# Patient Record
Sex: FEMALE | Race: WHITE | NOT HISPANIC OR LATINO | Employment: FULL TIME | URBAN - METROPOLITAN AREA
[De-identification: names, ages, dates, MRNs, and addresses within clinical notes are randomized per-mention and may not be internally consistent; named-entity substitution may affect disease eponyms.]

---

## 2017-09-25 ENCOUNTER — RECORDS - HEALTHEAST (OUTPATIENT)
Dept: LAB | Facility: CLINIC | Age: 57
End: 2017-09-25

## 2017-09-25 LAB
CHOLEST SERPL-MCNC: 226 MG/DL
FASTING STATUS PATIENT QL REPORTED: ABNORMAL
HDLC SERPL-MCNC: 55 MG/DL
LDLC SERPL CALC-MCNC: 148 MG/DL
TRIGL SERPL-MCNC: 113 MG/DL

## 2017-09-28 LAB
HPV INTERPRETATION - HISTORICAL: ABNORMAL
HPV INTERPRETER - HISTORICAL: ABNORMAL

## 2017-10-02 LAB
BKR LAB AP ABNORMAL BLEEDING: NO
BKR LAB AP BIRTH CONTROL/HORMONES: NORMAL
BKR LAB AP CERVICAL APPEARANCE: NORMAL
BKR LAB AP GYN ADEQUACY: NORMAL
BKR LAB AP GYN INTERPRETATION: NORMAL
BKR LAB AP HPV REFLEX: NORMAL
BKR LAB AP LMP: 2012
BKR LAB AP PATIENT STATUS: NORMAL
BKR LAB AP PREVIOUS ABNORMAL: NORMAL
BKR LAB AP PREVIOUS NORMAL: NORMAL
HIGH RISK?: NO
PATH REPORT.COMMENTS IMP SPEC: NORMAL
RESULT FLAG (HE HISTORICAL CONVERSION): NORMAL

## 2018-05-16 ENCOUNTER — RECORDS - HEALTHEAST (OUTPATIENT)
Dept: LAB | Facility: CLINIC | Age: 58
End: 2018-05-16

## 2018-05-16 LAB
CHOLEST SERPL-MCNC: 225 MG/DL
FASTING STATUS PATIENT QL REPORTED: NO
HDLC SERPL-MCNC: 56 MG/DL
LDLC SERPL CALC-MCNC: 151 MG/DL
TRIGL SERPL-MCNC: 89 MG/DL

## 2019-08-02 ENCOUNTER — RECORDS - HEALTHEAST (OUTPATIENT)
Dept: LAB | Facility: CLINIC | Age: 59
End: 2019-08-02

## 2019-08-02 LAB
CHOLEST SERPL-MCNC: 218 MG/DL
FASTING STATUS PATIENT QL REPORTED: YES
HDLC SERPL-MCNC: 47 MG/DL
LDLC SERPL CALC-MCNC: 139 MG/DL
TRIGL SERPL-MCNC: 160 MG/DL

## 2020-02-24 ENCOUNTER — AMBULATORY - HEALTHEAST (OUTPATIENT)
Dept: CARE COORDINATION | Facility: CLINIC | Age: 60
End: 2020-02-24
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
## 2020-02-26 ENCOUNTER — COMMUNICATION - HEALTHEAST (OUTPATIENT)
Dept: SCHEDULING | Facility: CLINIC | Age: 60
End: 2020-02-26

## 2020-02-26 ENCOUNTER — TELEPHONE (OUTPATIENT)
Dept: NURSING | Facility: CLINIC | Age: 60
End: 2020-02-26

## 2020-02-27 ENCOUNTER — OFFICE VISIT - HEALTHEAST (OUTPATIENT)
Dept: FAMILY MEDICINE | Facility: CLINIC | Age: 60
End: 2020-02-27

## 2020-02-27 DIAGNOSIS — E78.00 HYPERCHOLESTEROLEMIA: ICD-10-CM

## 2020-02-27 DIAGNOSIS — H61.23 BILATERAL IMPACTED CERUMEN: ICD-10-CM

## 2020-02-27 DIAGNOSIS — H65.93 FLUID LEVEL BEHIND TYMPANIC MEMBRANE OF BOTH EARS: ICD-10-CM

## 2020-02-27 DIAGNOSIS — H81.10 BENIGN PAROXYSMAL POSITIONAL VERTIGO, UNSPECIFIED LATERALITY: ICD-10-CM

## 2020-02-27 LAB
ALBUMIN SERPL-MCNC: 3.7 G/DL (ref 3.5–5)
ALP SERPL-CCNC: 137 U/L (ref 45–120)
ALT SERPL W P-5'-P-CCNC: 31 U/L (ref 0–45)
ANION GAP SERPL CALCULATED.3IONS-SCNC: 7 MMOL/L (ref 5–18)
AST SERPL W P-5'-P-CCNC: 24 U/L (ref 0–40)
BILIRUB SERPL-MCNC: 0.6 MG/DL (ref 0–1)
BUN SERPL-MCNC: 7 MG/DL (ref 8–22)
CALCIUM SERPL-MCNC: 9.7 MG/DL (ref 8.5–10.5)
CHLORIDE BLD-SCNC: 103 MMOL/L (ref 98–107)
CHOLEST SERPL-MCNC: 202 MG/DL
CO2 SERPL-SCNC: 30 MMOL/L (ref 22–31)
CREAT SERPL-MCNC: 0.76 MG/DL (ref 0.6–1.1)
FASTING STATUS PATIENT QL REPORTED: ABNORMAL
GFR SERPL CREATININE-BSD FRML MDRD: >60 ML/MIN/1.73M2
GLUCOSE BLD-MCNC: 102 MG/DL (ref 70–125)
HDLC SERPL-MCNC: 53 MG/DL
LDLC SERPL CALC-MCNC: 134 MG/DL
POTASSIUM BLD-SCNC: 4.7 MMOL/L (ref 3.5–5)
PROT SERPL-MCNC: 7.1 G/DL (ref 6–8)
SODIUM SERPL-SCNC: 140 MMOL/L (ref 136–145)
TRIGL SERPL-MCNC: 77 MG/DL

## 2020-02-27 ASSESSMENT — MIFFLIN-ST. JEOR: SCORE: 1451.73

## 2020-03-02 ENCOUNTER — COMMUNICATION - HEALTHEAST (OUTPATIENT)
Dept: INTERNAL MEDICINE | Facility: CLINIC | Age: 60
End: 2020-03-02

## 2020-08-12 ENCOUNTER — OFFICE VISIT - HEALTHEAST (OUTPATIENT)
Dept: FAMILY MEDICINE | Facility: CLINIC | Age: 60
End: 2020-08-12

## 2020-08-12 DIAGNOSIS — E78.00 HYPERCHOLESTEROLEMIA: ICD-10-CM

## 2020-08-12 DIAGNOSIS — F51.01 PRIMARY INSOMNIA: ICD-10-CM

## 2020-08-12 DIAGNOSIS — Z12.11 SCREEN FOR COLON CANCER: ICD-10-CM

## 2020-08-12 DIAGNOSIS — Z12.31 VISIT FOR SCREENING MAMMOGRAM: ICD-10-CM

## 2020-08-12 RX ORDER — ATORVASTATIN CALCIUM 20 MG/1
20 TABLET, FILM COATED ORAL DAILY
Qty: 90 TABLET | Refills: 0 | Status: SHIPPED | OUTPATIENT
Start: 2020-08-12 | End: 2023-08-29

## 2020-08-12 RX ORDER — QUETIAPINE FUMARATE 300 MG/1
300 TABLET, FILM COATED ORAL AT BEDTIME
Qty: 90 TABLET | Refills: 3 | Status: SHIPPED | OUTPATIENT
Start: 2020-08-12 | End: 2021-07-23

## 2020-08-12 ASSESSMENT — PATIENT HEALTH QUESTIONNAIRE - PHQ9: SUM OF ALL RESPONSES TO PHQ QUESTIONS 1-9: 0

## 2021-03-09 ENCOUNTER — COMMUNICATION - HEALTHEAST (OUTPATIENT)
Dept: FAMILY MEDICINE | Facility: CLINIC | Age: 61
End: 2021-03-09

## 2021-04-06 ENCOUNTER — COMMUNICATION - HEALTHEAST (OUTPATIENT)
Dept: FAMILY MEDICINE | Facility: CLINIC | Age: 61
End: 2021-04-06

## 2021-05-27 ASSESSMENT — PATIENT HEALTH QUESTIONNAIRE - PHQ9: SUM OF ALL RESPONSES TO PHQ QUESTIONS 1-9: 0

## 2021-06-04 VITALS
HEART RATE: 75 BPM | HEIGHT: 62 IN | SYSTOLIC BLOOD PRESSURE: 132 MMHG | DIASTOLIC BLOOD PRESSURE: 80 MMHG | WEIGHT: 208 LBS | BODY MASS INDEX: 38.28 KG/M2 | OXYGEN SATURATION: 94 %

## 2021-06-04 VITALS
DIASTOLIC BLOOD PRESSURE: 78 MMHG | SYSTOLIC BLOOD PRESSURE: 134 MMHG | WEIGHT: 217 LBS | BODY MASS INDEX: 39.69 KG/M2 | OXYGEN SATURATION: 96 % | HEART RATE: 57 BPM

## 2021-06-06 NOTE — TELEPHONE ENCOUNTER
----- Message from Gwyn Hubbard MD sent at 2/29/2020  6:51 PM CST -----  Please call:    Elevated lipid panel  The 10-year ASCVD risk score ([place] CAROLINA Jr., et al., 2013) is: 8.7%  Recommend statin for added cardiovascular protection  Prescription sent to the preferred pharmacy    Other labs Normal    Lab values marked (H) or (L) are not clinically/medically significant

## 2021-06-06 NOTE — TELEPHONE ENCOUNTER
"Pt was seen on 2/21/2020 for a fall at [place].   Pt states she fell at work. Fell backwards and hit her head.     Feels dizzy when leaning forward. When she was picking up trash today she became dizzy.   Pt also feels dizzy when laying down.   When she stood up yesterday she had pain near her ribs. States all the way across.   Denies back pain.   Denies headache.   Denies blurry vision.     Hematoma is about 1/4 of the size it was.     Pt states she had the dizziness in the ED and it is slightly better.     Reason for Disposition    [1] MODERATE dizziness (e.g., vertigo; feels very unsteady, interferes with normal activities) AND [2] has been evaluated by physician for this    Protocols used: DIZZINESS - VERTIGO-A-AH    Pt triaged to disposition of \"be seen within 3 days\". RN advised of concerns with elevated blood pressures in the ED and dizziness.  Pt was advised of s/s to watch for and when to call back.    RN discussed care advice per protocol. RN advised to call back with any new or worsening symptoms.   Patient was warm transferred to [place] in scheduling to set up appointment to establish care.   Teena Chavez RN   [facility] RN Triage        "

## 2021-06-06 NOTE — PROGRESS NOTES
Assessment/Plan:        1. Fluid level behind tympanic membrane of both ears  2. Bilateral impacted cerumen  3. Benign paroxysmal positional vertigo, unspecified laterality  Exam findings and pathophysiology discussed    Plan:  - fluticasone propionate (FLONASE) 50 mcg/actuation nasal spray; 1 spray into each nostril 2 (two) times a day.  Dispense: 16 g; Refill: 0  - meclizine (ANTIVERT) 25 mg tablet; Take 1 tablet (25 mg total) by mouth 3 (three) times a day as needed for dizziness or nausea.  Dispense: 30 tablet; Refill: 1    4. Hypercholesterolemia  History of and she asked for rechecking the level    - Lipid Profile-elevated  Would consider going on lipid lowering medication   - Ambulatory referral to Nutrition Services    - Comprehensive Metabolic Panel         At the conclusion of the encounter the plan of care, disposition and all questions were answered and reviewed, and the patient acknowledged understanding and was involved in the decision making regarding the overall care plan.           Subjective:    Patient ID:   Grace Amaro is a 60 y.o. female with a history of hypercholesterolemia, here for healthcare establishment and for ER follow up of 2/21 seen for a fall with a closed head injury to her occiput with fairly boggy occipital hematoma and no loss of consciousness. CT of the head showed a subgaleal hematoma.  She now reports to having vertigo or spinning sensation with head position changes particularly with lying down bending down which she has had for the past couple of months and prior to her head injury         Review of Systems  Allergy: reviewed  General : negative  A complete 5 point review of systems was obtained and is negative other than what is stated in the HPI.       The following patient's history were reviewed and updated as appropriate:   She  has a past medical history of Hyperlipidemia and Insomnia..      Outpatient Encounter Medications as of 2/27/2020   Medication Sig  "Dispense Refill     QUEtiapine (SEROQUEL) 300 MG tablet Take 300 mg by mouth at bedtime.       No facility-administered encounter medications on file as of 2/27/2020.          Objective:   /80 (Patient Site: Right Arm, Patient Position: Sitting, Cuff Size: Adult Large)   Pulse 75   Ht 5' 2\" (1.575 m)   Wt 208 lb (94.3 kg)   LMP  (LMP Unknown)   SpO2 94%   BMI 38.04 kg/m        Physical Exam    General Appearance:    Alert, cooperative, no distress   Head:    Normocephalic, Sinus: Nontender   Eyes:    PERRL, conjunctiva/corneas clear, EOM's intact, both eyes   Ears:   Bilateral impacted cerumen, lavaged, normal canals bilaterally middle ear effusion bilaterally   Throat:   mucous membranes moist, pharynx normal without lesions   Neck:   Supple, symmetrical, trachea midline, no adenopathy;     thyroid:  no enlargement/tenderness/nodules;    Lungs:     clear to auscultation, no wheezes, rales or rhonchi, symmetric air entry    Chest Wall:    No tenderness or deformity    Heart:    Regular rate and rhythm, S1 and S2 normal, no murmur, rub   or gallop   Skin:   Skin color, texture, turgor normal, no rashes or lesions                         "

## 2021-06-10 NOTE — PATIENT INSTRUCTIONS - HE
1. Primary insomnia  - QUEtiapine (SEROQUEL) 300 MG tablet; Take 1 tablet (300 mg total) by mouth at bedtime.  Dispense: 90 tablet; Refill: 3    2. Hypercholesterolemia  - atorvastatin (LIPITOR) 20 MG tablet; Take 1 tablet (20 mg total) by mouth daily.  Dispense: 90 tablet; Refill: 0  - Lipid Profile; Future  - ALT (SGPT); Future    3. Visit for screening mammogram  - Mammo Screening Bilateral; Future    4. Screen for colon cancer  - Ambulatory referral for Colonoscopy

## 2021-06-10 NOTE — PROGRESS NOTES
Assessment/Plan:        1. Primary insomnia  Doing well, no side effects   Nl exam   Plan:   Refill:   - QUEtiapine (SEROQUEL) 300 MG tablet; Take 1 tablet (300 mg total) by mouth at bedtime.  Dispense: 90 tablet; Refill: 3    2. Hypercholesterolemia  Start:   - atorvastatin (LIPITOR) 20 MG tablet; Take 1 tablet (20 mg total) by mouth daily.  Dispense: 90 tablet; Refill: 0    Recheck labs in a month   - Lipid Profile; Future  - ALT (SGPT); Future    3. Visit for screening mammogram  - Mammo Screening Bilateral; Future    4. Screen for colon cancer    - Ambulatory referral for Colonoscopy        At the conclusion of the encounter the plan of care, disposition and all questions were answered and reviewed, and the patient acknowledged understanding and was involved in the decision making regarding the overall care plan.     Patient Care Team:  Gwyn Hubbard MD as PCP - General (Family Medicine)  Gwyn Hubbard MD as Assigned PCP          Subjective:    Patient ID:   Grace Amaro is a 60 y.o. female here for med check and follow up, taking seroquel for insomnia.  She's been on this medication for the past several years experiencing no side effects or intolerance.   She notes that never picked up her cholesterol medication and not sure what happened, but willing to go ahead and give it a try.          Review of Systems  Allergy: reviewed  General : negative  A complete 5 point review of systems was obtained and is negative other than what is stated in the HPI.    The following patient's history were reviewed and updated as appropriate:   She  has a past medical history of Hyperlipidemia and Insomnia..      Outpatient Encounter Medications as of 8/12/2020   Medication Sig Dispense Refill     QUEtiapine (SEROQUEL) 300 MG tablet Take 300 mg by mouth at bedtime.       atorvastatin (LIPITOR) 20 MG tablet Take 1 tablet (20 mg total) by mouth daily. 30 tablet 1     fluticasone propionate (FLONASE) 50  mcg/actuation nasal spray 1 spray into each nostril 2 (two) times a day. 16 g 0     meclizine (ANTIVERT) 25 mg tablet Take 1 tablet (25 mg total) by mouth 3 (three) times a day as needed for dizziness or nausea. 30 tablet 1     No facility-administered encounter medications on file as of 8/12/2020.          Objective:   /78 (Patient Site: Right Arm, Patient Position: Sitting, Cuff Size: Adult Regular)   Pulse (!) 57   Wt 217 lb (98.4 kg)   LMP  (LMP Unknown)   SpO2 96%   BMI 39.69 kg/m        Physical Exam  General Appearance:    Alert, well hydrated, no distress   Throat:   mucous membranes moist, pharynx normal without lesions   Neck:   Supple, symmetrical, trachea midline, no adenopathy;     thyroid:  no enlargement/tenderness/nodules;    Lungs:     clear to auscultation, no wheezes, rales or rhonchi, symmetric air entry     Heart:    Regular rate and rhythm, S1 and S2 normal, no murmur, rub   or gallop, no edema    Skin:   Skin color, texture, turgor normal, no rashes or lesions

## 2021-06-16 PROBLEM — E78.00 HYPERCHOLESTEROLEMIA: Status: ACTIVE | Noted: 2020-02-21

## 2021-06-16 PROBLEM — F51.01 PRIMARY INSOMNIA: Status: ACTIVE | Noted: 2020-02-21

## 2021-06-16 PROBLEM — E55.9 VITAMIN D DEFICIENCY: Status: ACTIVE | Noted: 2020-02-21

## 2021-06-16 PROBLEM — L20.89 FLEXURAL ATOPIC DERMATITIS: Status: ACTIVE | Noted: 2020-02-21

## 2021-06-21 NOTE — LETTER
Letter by Gwyn Hubbard MD at      Author: Gwyn Hubbard MD Service: -- Author Type: --    Filed:  Encounter Date: 3/9/2021 Status: (Other)       Grace NICOLE Amaro  [address]  [place] [address]    March 9, 2021    Dear Grace    In reviewing your records, we have determined a gap in your preventive services. Based on your age and health history, we recommend the follow service.     ? General Physical  ? Colon cancer screening  ? Mammogram  ? Immunization  ? Lab work  ? Med check    If you have had the service elsewhere, please contact us so we can update our records. Please let us know if you have transferred your care to another clinic.    Please call [phone] to schedule this appointment.    We believe that a strong preventive care program, including regular physicals and follow-up care is an important part of a healthy lifestyle and we are committed to helping you maintain your health.    Thank you for choosing us as your health care provider.    Sincerely,   [address], [address]  [facility] [address]  Phone Number:  [phone]

## 2021-06-21 NOTE — LETTER
Letter by Gwyn Hbubard MD at      Author: Gwyn Hubbard MD Service: -- Author Type: --    Filed:  Encounter Date: 4/6/2021 Status: (Other)         Grace Amaro  [address]  [place] [address]        Mammogram Reminder  We are writing to you as a reminder that you are overdue for your screening mammogram.  After reviewing our records, we could not find a current report of a mammogram.  If you have had one done in the last couple of weeks, please disregard this letter.  If you have had your mammogram done outside of the [facility] system, please have the report forwarded to us by the performing facility, so we can update our records.    MAKE A MAMMOGRAM APPOINTMENT TODAY!  Many women begin having mammograms at age 40, but every patient , their risk factors and health history are unique.  [facility] suggests talking with your provider about what age is best to start mammograms and your ongoing screening schedule.    Please call [phone] and they can help you schedule an appointment.          If you noticed any changes in our breasts, such as lumps, nipple discharge or persistent new breast pain, please make an appointment with your Physician straight away

## 2021-07-03 NOTE — ADDENDUM NOTE
Addendum Note by Gwyn Hubbard MD at 2/27/2020  9:00 AM     Author: Gwyn Hubbard MD Service: -- Author Type: Physician    Filed: 2/29/2020  6:51 PM Encounter Date: 2/27/2020 Status: Signed    : Gwyn Hubbard MD (Physician)    Addended by: GWYN HUBBARD on: 2/29/2020 06:51 PM        Modules accepted: Orders

## 2021-07-23 ENCOUNTER — OFFICE VISIT (OUTPATIENT)
Dept: FAMILY MEDICINE | Facility: CLINIC | Age: 61
End: 2021-07-23
Payer: COMMERCIAL

## 2021-07-23 VITALS
TEMPERATURE: 98.7 F | SYSTOLIC BLOOD PRESSURE: 130 MMHG | HEIGHT: 62 IN | HEART RATE: 60 BPM | BODY MASS INDEX: 39.07 KG/M2 | DIASTOLIC BLOOD PRESSURE: 90 MMHG | RESPIRATION RATE: 16 BRPM | WEIGHT: 212.31 LBS

## 2021-07-23 DIAGNOSIS — F51.01 PRIMARY INSOMNIA: ICD-10-CM

## 2021-07-23 DIAGNOSIS — S46.011A STRAIN OF RIGHT ROTATOR CUFF CAPSULE, INITIAL ENCOUNTER: Primary | ICD-10-CM

## 2021-07-23 PROCEDURE — 99213 OFFICE O/P EST LOW 20 MIN: CPT | Performed by: FAMILY MEDICINE

## 2021-07-23 RX ORDER — QUETIAPINE FUMARATE 300 MG/1
300 TABLET, FILM COATED ORAL AT BEDTIME
Qty: 90 TABLET | Refills: 3 | Status: SHIPPED | OUTPATIENT
Start: 2021-07-23 | End: 2022-08-08

## 2021-07-23 ASSESSMENT — MIFFLIN-ST. JEOR: SCORE: 1463.35

## 2021-07-23 NOTE — PATIENT INSTRUCTIONS
1. Ibuprofen 400 mg twice daily for 10 days, then as needed.  2. Exercises for shoulder 1-2 times daily for 14 days, then at least 5 days per week.  3. Return for physical as scheduled.

## 2021-07-23 NOTE — ASSESSMENT & PLAN NOTE
Range of motion is excellent. Conservative management with ibuprofen 400 mg twice daily for 10 days, exercises daily, and ice at the end of the day. Handout provided with exercises.

## 2021-07-23 NOTE — PROGRESS NOTES
Assessment/Plan:      Problem List Items Addressed This Visit        Musculoskeletal and Integumentary    Strain of right rotator cuff capsule, initial encounter - Primary     Range of motion is excellent. Conservative management with ibuprofen 400 mg twice daily for 10 days, exercises daily, and ice at the end of the day. Handout provided with exercises.            Other    Primary insomnia     Well managed with quetiapine. Refill sent.         Relevant Medications    QUEtiapine (SEROQUEL) 300 MG tablet        Patient Instructions   1. Ibuprofen 400 mg twice daily for 10 days, then as needed.  2. Exercises for shoulder 1-2 times daily for 14 days, then at least 5 days per week.  3. Return for physical as scheduled.         Subjective:   Grace Amaro is a 61 year old person who presents today with a couple of concerns. She reports her ears feel plugged and noise is muffled. No ear pain.    She is also complaining of right shoulder pain. 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 Pain has continued to bother her when she is resting although it isn't painful when she is working at this point. She is not taking anything for the pain.    She is also requesting refill of her quetiapine. She takes 300 mg at bedtime daily for sleep. It is working well for her.     Patient Active Problem List   Diagnosis     Flexural atopic dermatitis     Hypercholesterolemia     Primary insomnia     Rash     Vitamin D deficiency     Strain of right rotator cuff capsule, initial encounter      Past Medical History:   Diagnosis Date     Hyperlipidemia      Insomnia      Past Surgical History:   Procedure Laterality Date     TUBAL LIGATION         Review of System: Relevant items noted in HPI. ROS otherwise negative.     Objective:     Vitals:    07/23/21 0935   BP: (!) 130/90   BP Location: Left arm   Patient Position: G[place]   Cuff Size: Adult  "Large   Pulse: 60   Resp: 16   Temp: 98.7  F (37.1  C)   TempSrc: Oral   Weight: 96.3 kg (212 lb 5 oz)   Height: 1.562 m (5' 1.5\")        Physical Exam  Constitutional:       General: She is not in acute distress.     Appearance: She is not ill-appearing.   HENT:      Right Ear: There is impacted cerumen.      Left Ear: There is impacted cerumen.   Musculoskeletal:      Right shoulder: Tenderness (upper humerus) present. No swelling or deformity. Normal range of motion.      Left shoulder: No swelling, deformity or tenderness. Normal range of motion.      Cervical back: No deformity or tenderness. Normal range of motion.   Neurological:      Comments:  and upper arm strength normal bilaterally.        "

## 2021-08-06 ENCOUNTER — OFFICE VISIT (OUTPATIENT)
Dept: FAMILY MEDICINE | Facility: CLINIC | Age: 61
End: 2021-08-06
Payer: COMMERCIAL

## 2021-08-06 VITALS
RESPIRATION RATE: 16 BRPM | SYSTOLIC BLOOD PRESSURE: 130 MMHG | DIASTOLIC BLOOD PRESSURE: 82 MMHG | TEMPERATURE: 98.7 F | BODY MASS INDEX: 38.55 KG/M2 | HEART RATE: 56 BPM | HEIGHT: 62 IN | WEIGHT: 209.5 LBS

## 2021-08-06 DIAGNOSIS — E55.9 VITAMIN D DEFICIENCY: ICD-10-CM

## 2021-08-06 DIAGNOSIS — Z00.00 ANNUAL PHYSICAL EXAM: Primary | ICD-10-CM

## 2021-08-06 DIAGNOSIS — R87.810 CERVICAL HIGH RISK HPV (HUMAN PAPILLOMAVIRUS) TEST POSITIVE: ICD-10-CM

## 2021-08-06 DIAGNOSIS — F51.01 PRIMARY INSOMNIA: ICD-10-CM

## 2021-08-06 DIAGNOSIS — Z12.31 ENCOUNTER FOR SCREENING MAMMOGRAM FOR BREAST CANCER: ICD-10-CM

## 2021-08-06 DIAGNOSIS — Z12.11 SCREEN FOR COLON CANCER: ICD-10-CM

## 2021-08-06 DIAGNOSIS — Z12.4 PAP SMEAR FOR CERVICAL CANCER SCREENING: ICD-10-CM

## 2021-08-06 DIAGNOSIS — Z72.0 TOBACCO USE: ICD-10-CM

## 2021-08-06 DIAGNOSIS — E78.00 HYPERCHOLESTEROLEMIA: ICD-10-CM

## 2021-08-06 PROBLEM — S46.011A STRAIN OF RIGHT ROTATOR CUFF CAPSULE, INITIAL ENCOUNTER: Status: RESOLVED | Noted: 2021-07-23 | Resolved: 2021-08-06

## 2021-08-06 LAB
ALT SERPL W P-5'-P-CCNC: 25 U/L (ref 0–45)
ANION GAP SERPL CALCULATED.3IONS-SCNC: 8 MMOL/L (ref 5–18)
AST SERPL W P-5'-P-CCNC: 18 U/L (ref 0–40)
BUN SERPL-MCNC: 11 MG/DL (ref 8–22)
CALCIUM SERPL-MCNC: 9.1 MG/DL (ref 8.5–10.5)
CHLORIDE BLD-SCNC: 106 MMOL/L (ref 98–107)
CHOLEST SERPL-MCNC: 178 MG/DL
CO2 SERPL-SCNC: 29 MMOL/L (ref 22–31)
CREAT SERPL-MCNC: 0.67 MG/DL (ref 0.6–1.1)
FASTING STATUS PATIENT QL REPORTED: YES
GFR SERPL CREATININE-BSD FRML MDRD: >90 ML/MIN/1.73M2
GLUCOSE BLD-MCNC: 89 MG/DL (ref 70–125)
HBA1C MFR BLD: 5.4 %
HDLC SERPL-MCNC: 53 MG/DL
HIV 1+2 AB+HIV1 P24 AG SERPL QL IA: NEGATIVE
LDLC SERPL CALC-MCNC: 113 MG/DL
POTASSIUM BLD-SCNC: 4.8 MMOL/L (ref 3.5–5)
SODIUM SERPL-SCNC: 143 MMOL/L (ref 136–145)
TRIGL SERPL-MCNC: 58 MG/DL

## 2021-08-06 PROCEDURE — 84460 ALANINE AMINO (ALT) (SGPT): CPT | Performed by: FAMILY MEDICINE

## 2021-08-06 PROCEDURE — 82306 VITAMIN D 25 HYDROXY: CPT | Performed by: FAMILY MEDICINE

## 2021-08-06 PROCEDURE — 99396 PREV VISIT EST AGE 40-64: CPT | Mod: 25 | Performed by: FAMILY MEDICINE

## 2021-08-06 PROCEDURE — 36415 COLL VENOUS BLD VENIPUNCTURE: CPT | Performed by: FAMILY MEDICINE

## 2021-08-06 PROCEDURE — 80048 BASIC METABOLIC PNL TOTAL CA: CPT | Performed by: FAMILY MEDICINE

## 2021-08-06 PROCEDURE — 86803 HEPATITIS C AB TEST: CPT | Performed by: FAMILY MEDICINE

## 2021-08-06 PROCEDURE — 90732 PPSV23 VACC 2 YRS+ SUBQ/IM: CPT | Performed by: FAMILY MEDICINE

## 2021-08-06 PROCEDURE — 80061 LIPID PANEL: CPT | Performed by: FAMILY MEDICINE

## 2021-08-06 PROCEDURE — 90715 TDAP VACCINE 7 YRS/> IM: CPT | Performed by: FAMILY MEDICINE

## 2021-08-06 PROCEDURE — 83036 HEMOGLOBIN GLYCOSYLATED A1C: CPT | Performed by: FAMILY MEDICINE

## 2021-08-06 PROCEDURE — G0124 SCREEN C/V THIN LAYER BY MD: HCPCS | Performed by: PATHOLOGY

## 2021-08-06 PROCEDURE — G0123 SCREEN CERV/VAG THIN LAYER: HCPCS | Performed by: FAMILY MEDICINE

## 2021-08-06 PROCEDURE — 90471 IMMUNIZATION ADMIN: CPT | Performed by: FAMILY MEDICINE

## 2021-08-06 PROCEDURE — 87389 HIV-1 AG W/HIV-1&-2 AB AG IA: CPT | Performed by: FAMILY MEDICINE

## 2021-08-06 PROCEDURE — 90472 IMMUNIZATION ADMIN EACH ADD: CPT | Performed by: FAMILY MEDICINE

## 2021-08-06 PROCEDURE — 84450 TRANSFERASE (AST) (SGOT): CPT | Performed by: FAMILY MEDICINE

## 2021-08-06 PROCEDURE — 87624 HPV HI-RISK TYP POOLED RSLT: CPT | Performed by: FAMILY MEDICINE

## 2021-08-06 ASSESSMENT — MIFFLIN-ST. JEOR: SCORE: 1458.54

## 2021-08-06 NOTE — ASSESSMENT & PLAN NOTE
Patient is smoking 1-2 cigarettes per day. Encouraged cessation and she is agreeable. [place] Quit plan referral done. Rx sent for nicotine patches 7 mg daily for 6 weeks.

## 2021-08-06 NOTE — PROGRESS NOTES
FEMALE PREVENTATIVE EXAM    Assessment and Plan:     Problem List Items Addressed This Visit        Digestive    Vitamin D deficiency     Lab ordered today.         Relevant Orders    Vitamin D Deficiency       Endocrine    Hypercholesterolemia     Labs today. Anticipate continuing atorvastatin 20 mg daily.         Relevant Orders    Lipid Profile    ALT    AST       Behavioral    Tobacco use     Patient is smoking 1-2 cigarettes per day. Encouraged cessation and she is agreeable. [place] Quit plan referral done. Rx sent for nicotine patches 7 mg daily for 6 weeks.         Relevant Medications    nicotine (NICODERM CQ) 7 MG/24HR 24 hr patch       Other    Primary insomnia     Well controlled with quetiapine 300 mg at bedtime.         Cervical high risk HPV (human papillomavirus) test positive     Patient overdue for recheck. HPV and pap done today.         Relevant Orders    Gynecologic Cytology (PAP Smear)    BMI 38.0-38.9,adult    Relevant Orders    Hemoglobin A1c      Other Visit Diagnoses     Annual physical exam    -  Primary    Relevant Orders    Basic metabolic panel    HIV Antigen Antibody Combo    Hepatitis C antibody    Encounter for screening mammogram for breast cancer        Relevant Orders    *MA Screening Digital Bilateral    Screen for colon cancer        Relevant Orders    COLOGUARD([employer]ACT SCI[employer])    Pap smear for cervical cancer screening        Relevant Orders    Gynecologic Cytology (PAP Smear)        There are no Patient Instructions on file for this visit.   Subjective:   HPI: Grace Amaro is an 61 year old female here for a preventative health visit. She has no concerns today.    Answers for HPI/ROS submitted by the patient on 8/6/2021  Frequency of exercise:: 2-3 days/week  Getting at least 3 servings of Calcium per day:: NO  Diet:: Regular (no restrictions)  Bi-annual eye exam:: Yes  Dental care twice a year:: NO  Sleep apnea or symptoms of sleep apnea:: None  Additional concerns today::  "No  Duration of exercise:: 15-30 minutes        Patient Active Problem List   Diagnosis     Flexural atopic dermatitis     Hypercholesterolemia     Primary insomnia     Vitamin D deficiency     Cervical high risk HPV (human papillomavirus) test positive     Tobacco use     BMI 38.0-38.9,adult      Past Medical History:   Diagnosis Date     Hyperlipidemia      Insomnia       Past Surgical History:   Procedure Laterality Date     TUBAL LIGATION        Family History   Problem Relation Age of Onset     Cerebrovascular Disease Father      Heart Disease Father      Hypertension Father      No Known Problems Mother      No Known Problems Sister      No Known Problems Sister      No Known Problems Sister      No Known Problems Son      No Known Problems Daughter       Social History     Tobacco Use     Smoking status: Current Some Day Smoker     Smokeless tobacco: Never Used     Tobacco comment: 1-2 cig per day   Substance Use Topics     Alcohol use: Never      Review of System: Relevant items noted in HPI. ROS otherwise negative.     Objective:   Vital Signs:   /82 (BP Location: Left arm, Patient Position: Sitting, Cuff Size: Adult Large)   Pulse 56   Temp 98.7  F (37.1  C) (Oral)   Resp 16   Ht 1.575 m (5' 2\")   Wt 95 kg (209 lb 8 oz)   LMP 07/23/2016 (Approximate)   BMI 38.32 kg/m       PHYSICAL EXAM  Physical Exam  Constitutional:       General: She is not in acute distress.     Appearance: She is well-developed.   HENT:      Right Ear: Tympanic membrane and external ear normal.      Left Ear: Tympanic membrane and external ear normal.      Nose: Nose normal.      Mouth/Throat:      Pharynx: No oropharyngeal exudate.   Eyes:      General:         Right eye: No discharge.         Left eye: No discharge.      Conjunctiva/sclera: Conjunctivae normal.      Pupils: Pupils are equal, round, and reactive to light.   Neck:      Thyroid: No thyromegaly.      Trachea: No tracheal deviation.   Cardiovascular:      " Rate and Rhythm: Normal rate and regular rhythm.      Pulses: Normal pulses.      Heart sounds: Normal heart sounds, S1 normal and S2 normal. No murmur heard.   No friction rub. No S3 or S4 sounds.    Pulmonary:      Effort: Pulmonary effort is normal. No respiratory distress.      Breath sounds: Normal breath sounds. No wheezing or rales.   Chest:      Breasts:         Right: No mass, nipple discharge or tenderness.         Left: No mass, nipple discharge or tenderness.   Abdominal:      General: Bowel sounds are normal.      Palpations: Abdomen is soft. There is no mass.      Tenderness: There is no abdominal tenderness.   Genitourinary:     Vagina: Normal.      Cervix: Normal.      Uterus: Normal. Not enlarged.       Adnexa: Right adnexa normal and left adnexa normal.   Musculoskeletal:         General: Normal range of motion.      Cervical back: Neck supple.   Lymphadenopathy:      Cervical: No cervical adenopathy.   Skin:     General: Skin is warm and dry.      Findings: No rash.   Neurological:      Mental Status: She is alert and oriented to person, place, and time.      Motor: No abnormal muscle tone.      Deep Tendon Reflexes: Reflexes are normal and symmetric.   Psychiatric:         Thought Content: Thought content normal.         Judgment: Judgment normal.

## 2021-08-06 NOTE — PATIENT INSTRUCTIONS
1. We will notify you of lab results.  2. You should get a call to schedule mammogram.  3. Complete Cologuard testing.  4. Nicotine patches. Start when you quit smoking. Wear 1 patch per day for 6 weeks. You should also get a call from [employer] [place] for support.  5. Consider shingles vaccine.

## 2021-08-09 LAB
DEPRECATED CALCIDIOL+CALCIFEROL SERPL-MC: 34 UG/L (ref 30–80)
HCV AB SERPL QL IA: NEGATIVE

## 2021-08-11 LAB
HUMAN PAPILLOMA VIRUS 16 DNA: NEGATIVE
HUMAN PAPILLOMA VIRUS 18 DNA: NEGATIVE
HUMAN PAPILLOMA VIRUS FINAL DIAGNOSIS: ABNORMAL
HUMAN PAPILLOMA VIRUS OTHER HR: POSITIVE

## 2021-08-16 LAB
BKR LAB AP GYN ADEQUACY: ABNORMAL
BKR LAB AP GYN INTERPRETATION: ABNORMAL
BKR LAB AP GYN OTHER FINDINGS: ABNORMAL
BKR LAB AP HPV REFLEX: ABNORMAL
BKR LAB AP LMP: ABNORMAL
BKR LAB AP PREVIOUS ABNL DX: ABNORMAL
BKR LAB AP PREVIOUS ABNORMAL: ABNORMAL
PATH REPORT.COMMENTS IMP SPEC: ABNORMAL
PATH REPORT.RELEVANT HX SPEC: ABNORMAL

## 2021-08-17 ENCOUNTER — PATIENT OUTREACH (OUTPATIENT)
Dept: FAMILY MEDICINE | Facility: CLINIC | Age: 61
End: 2021-08-17

## 2021-08-21 ENCOUNTER — HEALTH MAINTENANCE LETTER (OUTPATIENT)
Age: 61
End: 2021-08-21

## 2021-09-03 ENCOUNTER — OFFICE VISIT (OUTPATIENT)
Dept: FAMILY MEDICINE | Facility: CLINIC | Age: 61
End: 2021-09-03
Payer: COMMERCIAL

## 2021-09-03 VITALS
SYSTOLIC BLOOD PRESSURE: 126 MMHG | WEIGHT: 210.25 LBS | BODY MASS INDEX: 38.69 KG/M2 | TEMPERATURE: 98.5 F | HEART RATE: 64 BPM | RESPIRATION RATE: 16 BRPM | DIASTOLIC BLOOD PRESSURE: 80 MMHG | HEIGHT: 62 IN

## 2021-09-03 DIAGNOSIS — R87.810 CERVICAL HIGH RISK HPV (HUMAN PAPILLOMAVIRUS) TEST POSITIVE: Primary | ICD-10-CM

## 2021-09-03 DIAGNOSIS — Z87.891 HISTORY OF TOBACCO ABUSE: ICD-10-CM

## 2021-09-03 PROCEDURE — 99207 PR NO CHARGE LOS: CPT | Performed by: FAMILY MEDICINE

## 2021-09-03 PROCEDURE — 88305 TISSUE EXAM BY PATHOLOGIST: CPT | Performed by: PATHOLOGY

## 2021-09-03 PROCEDURE — 57455 BIOPSY OF CERVIX W/SCOPE: CPT | Performed by: FAMILY MEDICINE

## 2021-09-03 ASSESSMENT — MIFFLIN-ST. JEOR: SCORE: 1461.94

## 2021-09-03 NOTE — PROGRESS NOTES
COLPOSCOPY PROCEDURE NOTE  Grace Amaro is a 61 year old female who presents today for colposcopy.     HISTORY  Patient's last menstrual period was 07/23/2016 (approximate).   Most recent pap smear results: ASCUS HPV high risk positive   History of cervical pathology: positive HPV in 2017, no follow up.  Previous treatment: None  Received HPV vaccine? No  Tobacco use: Yes  Allergic to Iodine or Betadine: No    LAB  Pregnancy test:not done due to age    PRE-PROCEDURE  The nature and meaning of PAP smears discussed: Yes  HPV infection in relation to PAP smear changes discussed: Yes  Cervical dysplasia and its significance and natural history discussed: Yes  Colposcopy procedure explained: Yes  Side effects, risks and complications discussed (including risk of bleeding,  infection, non-diagnostic colposcopy result): Yes    PROCEDURE NOTE    The vulva, vagina, and perianal area were examined with findings of: normal    A speculum was placed and the cervix was painted with acetic acid. Cervix was also evaluated with green light. The following findings were noted:  Squamocolumnar junction entirely visualized? Yes  Lesion(s) present: No  Acetowhite changes: Yes: rim of acetowhite just outside the transition zone.  Abnormal vessels: No  Any lesions entirely visualized? Yes  Lugol's Applied: Yes. Findings: normal uptake  Anesthesia: none  Endocervical sample performed: Yes  Cervical biopsies obtained at 7 o'clock in acetowhite area  Other samples: None  Hemostasis achieved with Monsel's solution. yes      IMPRESSION: JEZ 1    PLAN:  Further plan will be based on biopsy   Results and plan will be communicated with the patient.  Discussed the importance of appropriate follow-up: Yes  Diagnosis added to problem list, medical history, surgical history as indicated.

## 2021-09-03 NOTE — PATIENT INSTRUCTIONS
Good work on quitting smoking!    1) You may have mild to moderate bleeding and/or cramping following procedure.  This usually resolves the day of the procedure but may continue for 2-3 days.  If needed, use pads for bleeding.  Do not use tampons for 7 days after your procedure. I recommend not using a vaginal cup when you have an IUD in place.  2) If biopsies were taken, you may notice a dark brown, or black discharge.  This is related to the medication used to treat bleeding on the cervix and is not concerning.  3) Take Ibuprofen 400 mg every 6 hours as needed.  4) Follow up promptly if you are having heavy bleeding, fever >100.5, or other concerns.  5) We will notify you of biopsy results when available.  This usually takes 5-10 days.

## 2021-09-08 LAB
PATH REPORT.COMMENTS IMP SPEC: NORMAL
PATH REPORT.FINAL DX SPEC: NORMAL
PATH REPORT.GROSS SPEC: NORMAL
PATH REPORT.MICROSCOPIC SPEC OTHER STN: NORMAL
PATH REPORT.RELEVANT HX SPEC: NORMAL
PHOTO IMAGE: NORMAL

## 2021-10-04 ENCOUNTER — PATIENT OUTREACH (OUTPATIENT)
Dept: FAMILY MEDICINE | Facility: CLINIC | Age: 61
End: 2021-10-04

## 2021-10-04 NOTE — LETTER
October 4, 2021      Grace A Sondra  [address]   [place] [address]        Dear ,         Your colposcopy biopsy results showed low grade changes (mild changes) both on the outside of the cervix and from the inside of the cervix. These changes do not require any treatment at this time. We should repeat your pap smear in one year.     Please contact me with any questions,  Sandra Booker MD    If you have additional questions regarding this result, please contact our office and we will be happy to assist you.      Sincerely,    Your [facility] Care Team

## 2021-10-16 ENCOUNTER — HEALTH MAINTENANCE LETTER (OUTPATIENT)
Age: 61
End: 2021-10-16

## 2022-01-20 ENCOUNTER — ANCILLARY PROCEDURE (OUTPATIENT)
Dept: MAMMOGRAPHY | Facility: CLINIC | Age: 62
End: 2022-01-20
Attending: FAMILY MEDICINE
Payer: COMMERCIAL

## 2022-01-20 DIAGNOSIS — Z12.31 ENCOUNTER FOR SCREENING MAMMOGRAM FOR BREAST CANCER: ICD-10-CM

## 2022-01-20 PROCEDURE — 77067 SCR MAMMO BI INCL CAD: CPT

## 2022-08-08 DIAGNOSIS — F51.01 PRIMARY INSOMNIA: ICD-10-CM

## 2022-08-08 RX ORDER — QUETIAPINE FUMARATE 300 MG/1
300 TABLET, FILM COATED ORAL AT BEDTIME
Qty: 90 TABLET | Refills: 0 | Status: SHIPPED | OUTPATIENT
Start: 2022-08-08 | End: 2022-09-14

## 2022-08-08 NOTE — TELEPHONE ENCOUNTER
Previous pt of Dr. Booker has appt to est care with you on 9/14  Please advise if you will fill meds until she can est care?

## 2022-09-14 ENCOUNTER — OFFICE VISIT (OUTPATIENT)
Dept: FAMILY MEDICINE | Facility: CLINIC | Age: 62
End: 2022-09-14
Payer: COMMERCIAL

## 2022-09-14 VITALS
TEMPERATURE: 98.2 F | BODY MASS INDEX: 38.5 KG/M2 | HEART RATE: 65 BPM | WEIGHT: 209.2 LBS | OXYGEN SATURATION: 95 % | HEIGHT: 62 IN | DIASTOLIC BLOOD PRESSURE: 82 MMHG | SYSTOLIC BLOOD PRESSURE: 124 MMHG | RESPIRATION RATE: 16 BRPM

## 2022-09-14 DIAGNOSIS — Z87.891 HISTORY OF TOBACCO ABUSE: ICD-10-CM

## 2022-09-14 DIAGNOSIS — E78.00 HYPERCHOLESTEROLEMIA: ICD-10-CM

## 2022-09-14 DIAGNOSIS — H61.23 EXCESSIVE CERUMEN IN EAR CANAL, BILATERAL: ICD-10-CM

## 2022-09-14 DIAGNOSIS — Z12.11 SCREEN FOR COLON CANCER: ICD-10-CM

## 2022-09-14 DIAGNOSIS — R87.810 CERVICAL HIGH RISK HPV (HUMAN PAPILLOMAVIRUS) TEST POSITIVE: ICD-10-CM

## 2022-09-14 DIAGNOSIS — Z00.00 ENCOUNTER FOR ROUTINE ADULT HEALTH EXAMINATION WITHOUT ABNORMAL FINDINGS: Primary | ICD-10-CM

## 2022-09-14 DIAGNOSIS — E66.01 CLASS 2 SEVERE OBESITY DUE TO EXCESS CALORIES WITH SERIOUS COMORBIDITY AND BODY MASS INDEX (BMI) OF 38.0 TO 38.9 IN ADULT (H): ICD-10-CM

## 2022-09-14 DIAGNOSIS — E66.812 CLASS 2 SEVERE OBESITY DUE TO EXCESS CALORIES WITH SERIOUS COMORBIDITY AND BODY MASS INDEX (BMI) OF 38.0 TO 38.9 IN ADULT (H): ICD-10-CM

## 2022-09-14 DIAGNOSIS — F51.01 PRIMARY INSOMNIA: ICD-10-CM

## 2022-09-14 DIAGNOSIS — Z13.1 SCREENING FOR DIABETES MELLITUS: ICD-10-CM

## 2022-09-14 PROCEDURE — 99396 PREV VISIT EST AGE 40-64: CPT | Mod: 25 | Performed by: FAMILY MEDICINE

## 2022-09-14 PROCEDURE — 69210 REMOVE IMPACTED EAR WAX UNI: CPT | Performed by: FAMILY MEDICINE

## 2022-09-14 RX ORDER — QUETIAPINE FUMARATE 300 MG/1
300 TABLET, FILM COATED ORAL AT BEDTIME
Qty: 90 TABLET | Refills: 3 | Status: SHIPPED | OUTPATIENT
Start: 2022-09-14 | End: 2023-10-26

## 2022-09-14 ASSESSMENT — ENCOUNTER SYMPTOMS
DYSURIA: 0
COUGH: 0
NAUSEA: 0
ARTHRALGIAS: 0
CHILLS: 0
PARESTHESIAS: 0
HEMATURIA: 0
HEMATOCHEZIA: 0
HEARTBURN: 0
CONSTIPATION: 0
FREQUENCY: 0
EYE PAIN: 0
JOINT SWELLING: 0
PALPITATIONS: 0
BREAST MASS: 0
SHORTNESS OF BREATH: 0
DIARRHEA: 0
FEVER: 0
HEADACHES: 0
MYALGIAS: 0
WEAKNESS: 0
SORE THROAT: 0
DIZZINESS: 0
ABDOMINAL PAIN: 0
NERVOUS/ANXIOUS: 0

## 2022-09-14 NOTE — ASSESSMENT & PLAN NOTE
This patient has been on 300 mg of Seroquel for 10+ years.  This was started in the context of some family difficulties.  She has not tried a taper at any point.  She declines/denies symptoms of side effects.  Weight is stable.  She has physical exam characteristics and some laboratory evidence of metabolic syndrome.  Seroquel may be contributory.  Consider lengthy taper at some point in the future.

## 2022-09-14 NOTE — ASSESSMENT & PLAN NOTE
Weight stable over the past couple of years.  She has physical exam characteristics of metabolic syndrome/insulin resistance.  Fasting lab work recommended.  Role of Seroquel?  Might she benefit from trazodone or simply a thoughtful taper of Seroquel?

## 2022-09-14 NOTE — ASSESSMENT & PLAN NOTE
Annual exam.  No specific concerns.  - Referral to gynecology for follow-up Pap smear.  - Discussed vaccines.  Discussed smoking cessation.  - Fasting lab work will be completed in the near future.  She did have some juice this morning and social need to return.

## 2022-09-14 NOTE — ASSESSMENT & PLAN NOTE
The patient has been smoking 1 cigarette/day for the past year or so.  She is never smoked more than 2 or 3/day.  She does not meet criteria for lung cancer screening.

## 2022-09-14 NOTE — PROGRESS NOTES
SUBJECTIVE:   CC: Grace is an 62 year old who presents for preventive health visit.     Lipids:   - she has not been on atorvastatin for the past few months.  She ran out and simply did not restart.    Patient has been advised of split billing requirements and indicates understanding: Yes  Healthy Habits:     Taking medications regularly:  0    PHQ-2 Total Score: 0  History of Present Illness       Reason for visit:  See about my seroquel medication    She eats 2-3 servings of fruits and vegetables daily.She consumes 0 sweetened beverage(s) daily.She exercises with enough effort to increase her heart rate 30 to 60 minutes per day.  She exercises with enough effort to increase her heart rate 5 days per week.   She is taking medications regularly.      Today's PHQ-2 Score:   PHQ-2 ( 1999 Pfizer) 9/14/2022   Q1: Little interest or pleasure in doing things 0   Q2: Feeling down, depressed or hopeless 0   PHQ-2 Score 0   PHQ-2 Total Score (12-17 Years)- Positive if 3 or more points; Administer PHQ-A if positive -   Q1: Little interest or pleasure in doing things Not at all   Q2: Feeling down, depressed or hopeless Not at all   PHQ-2 Score 0        Abuse: Current or Past (Physical, Sexual or Emotional) - No  Do you feel safe in your environment? Yes        Social History     Tobacco Use     Smoking status: Current Every Day Smoker     Packs/day: 0.05     Smokeless tobacco: Never Used     Tobacco comment: 1-2 cig per day   Substance Use Topics     Alcohol use: Never     If you drink alcohol do you typically have >3 drinks per day or >7 drinks per week? No    Alcohol Use 9/14/2022   Prescreen: >3 drinks/day or >7 drinks/week? -   Prescreen: >3 drinks/day or >7 drinks/week? No       Reviewed orders with patient.  Reviewed health maintenance and updated orders accordingly - Yes    Breast Cancer Screening:  Any new diagnosis of family breast, ovarian, or bowel cancer? No    FHS-7:   Breast CA Risk Assessment (FHS-7)  1/20/2022   Did any of your first-degree relatives have breast or ovarian cancer? No   Did any of your relatives have bilateral breast cancer? No   Did any man in your family have breast cancer? No   Did any woman in your family have breast and ovarian cancer? No   Did any woman in your family have breast cancer before age 50 y? No   Do you have 2 or more relatives with breast and/or ovarian cancer? No   Do you have 2 or more relatives with breast and/or bowel cancer? No       Mammogram Screening: Recommended mammography every 1-2 years with patient discussion and risk factor consideration  Pertinent mammograms are reviewed under the imaging tab.    History of abnormal Pap smear: YES - updated in Problem List and Health Maintenance accordingly  PAP / HPV Latest Ref Rng & Units 8/6/2021 9/25/2017   PAP   Atypical squamous cells of undetermined significance (ASC-US)(A) Negative for squamous intraepithelial lesion or malignancy  Electronically signed by Radha Reese CT (ASCP) on 10/2/2017 at  9:21 AM     HPV16 Negative Negative -   HPV18 Negative Negative -   HRHPV Negative Positive(A) -     Reviewed and updated as needed this visit by clinical staff   Tobacco  Allergies  Meds   Med Hx  Surg Hx  Fam Hx            Reviewed and updated as needed this visit by Provider   Tobacco  Allergies    Med Hx  Surg Hx  Fam Hx               Review of Systems   Constitutional: Negative for chills and fever.   HENT: Negative for congestion, ear pain, hearing loss and sore throat.    Eyes: Negative for pain and visual disturbance.   Respiratory: Negative for cough and shortness of breath.    Cardiovascular: Negative for chest pain, palpitations and peripheral edema.   Gastrointestinal: Negative for abdominal pain, constipation, diarrhea, heartburn, hematochezia and nausea.   Breasts:  Negative for tenderness, breast mass and discharge.   Genitourinary: Negative for dysuria, frequency, genital sores, hematuria,  "pelvic pain, urgency, vaginal bleeding and vaginal discharge.   Musculoskeletal: Negative for arthralgias, joint swelling and myalgias.   Skin: Negative for rash.   Neurological: Negative for dizziness, weakness, headaches and paresthesias.   Psychiatric/Behavioral: Negative for mood changes. The patient is not nervous/anxious.         OBJECTIVE:   /82 (BP Location: Left arm, Patient Position: Sitting, Cuff Size: Adult Large)   Pulse 65   Temp 98.2  F (36.8  C) (Oral)   Resp 16   Ht 1.562 m (5' 1.5\")   Wt 94.9 kg (209 lb 3.2 oz)   LMP 07/23/2016 (Approximate)   SpO2 95%   BMI 38.89 kg/m    Physical Exam  Vitals reviewed.   Constitutional:       General: She is not in acute distress.     Appearance: Normal appearance. She is obese. She is not ill-appearing.   HENT:      Head: Normocephalic and atraumatic.      Right Ear: External ear normal.      Left Ear: External ear normal.      Nose: Nose normal.      Mouth/Throat:      Pharynx: Oropharynx is clear. No oropharyngeal exudate or posterior oropharyngeal erythema.   Eyes:      General: No scleral icterus.        Right eye: No discharge.         Left eye: No discharge.      Extraocular Movements: Extraocular movements intact.      Conjunctiva/sclera: Conjunctivae normal.      Pupils: Pupils are equal, round, and reactive to light.   Neck:      Comments: No thyromegaly.  Cardiovascular:      Rate and Rhythm: Normal rate and regular rhythm.      Heart sounds: Normal heart sounds. No murmur heard.    No friction rub. No gallop.   Pulmonary:      Effort: Pulmonary effort is normal. No respiratory distress.      Breath sounds: Normal breath sounds. No wheezing or rales.   Abdominal:      General: There is no distension.      Palpations: Abdomen is soft. There is no mass.      Tenderness: There is no abdominal tenderness.   Musculoskeletal:         General: No signs of injury. Normal range of motion.      Cervical back: Normal range of motion.      Right lower " leg: No edema.      Left lower leg: No edema.   Lymphadenopathy:      Cervical: No cervical adenopathy.   Skin:     General: Skin is warm.      Coloration: Skin is not jaundiced.      Findings: No rash.      Comments: Skin tags   Neurological:      General: No focal deficit present.      Mental Status: She is alert and oriented to person, place, and time.      Cranial Nerves: No cranial nerve deficit.      Deep Tendon Reflexes: Reflexes normal.   Psychiatric:         Mood and Affect: Mood normal.           ASSESSMENT/PLAN:     Problem List Items Addressed This Visit     Cervical high risk HPV (human papillomavirus) test positive    Relevant Orders    Ob/Gyn Referral    Class 2 severe obesity due to excess calories with serious comorbidity and body mass index (BMI) of 38.0 to 38.9 in adult (H)     Weight stable over the past couple of years.  She has physical exam characteristics of metabolic syndrome/insulin resistance.  Fasting lab work recommended.  Role of Seroquel?  Might she benefit from trazodone or simply a thoughtful taper of Seroquel?           Encounter for routine adult health examination without abnormal findings - Primary     Annual exam.  No specific concerns.  - Referral to gynecology for follow-up Pap smear.  - Discussed vaccines.  Discussed smoking cessation.  - Fasting lab work will be completed in the near future.  She did have some juice this morning and social need to return.           Excessive cerumen in ear canal, bilateral     Nursing staff irrigated ears.           Relevant Orders    REMOVE IMPACTED CERUMEN (Completed)    History of tobacco abuse     The patient has been smoking 1 cigarette/day for the past year or so.  She is never smoked more than 2 or 3/day.  She does not meet criteria for lung cancer screening.           Hypercholesterolemia     Today's fasting lab draw will reflect lipids off statin therapy.  Primary prevention.           Relevant Orders    Basic metabolic panel  (Ca,  "Cl, CO2, Creat, Gluc, K, Na, BUN)    ALT    Lipid Profile (Chol, Trig, HDL, LDL calc)    Primary insomnia     This patient has been on 300 mg of Seroquel for 10+ years.  This was started in the context of some family difficulties.  She has not tried a taper at any point.  She declines/denies symptoms of side effects.  Weight is stable.  She has physical exam characteristics and some laboratory evidence of metabolic syndrome.  Seroquel may be contributory.  Consider lengthy taper at some point in the future.           Relevant Medications    QUEtiapine (SEROQUEL) 300 MG tablet      Other Visit Diagnoses     Screen for colon cancer        Relevant Orders    COLOGUARD(EX[employer] SCIENCES) (Completed)    Screening for diabetes mellitus        Relevant Orders    Basic metabolic panel  (Ca, Cl, CO2, Creat, Gluc, K, Na, BUN)    Hemoglobin A1c          Patient has been advised of split billing requirements and indicates understanding: Yes    COUNSELING:  Reviewed preventive health counseling, as reflected in patient instructions       Regular exercise       Healthy diet/nutrition    Estimated body mass index is 38.89 kg/m  as calculated from the following:    Height as of this encounter: 1.562 m (5' 1.5\").    Weight as of this encounter: 94.9 kg (209 lb 3.2 oz).    Weight management plan: Discussed healthy diet and exercise guidelines    She reports that she has been smoking. She has been smoking about 0.05 packs per day. She has never used smokeless tobacco.      Counseling Resources:  ATP IV Guidelines  Pooled Cohorts Equation Calculator  Breast Cancer Risk Calculator  BRCA-Related Cancer Risk Assessment: FHS-7 Tool  FRAX Risk Assessment  ICSI Preventive Guidelines  Dietary Guidelines for Americans, 2010  USDA's MyPlate  ASA Prophylaxis  Lung CA Screening    Tobi Martinez MD  [facility]"

## 2022-10-01 ENCOUNTER — HEALTH MAINTENANCE LETTER (OUTPATIENT)
Age: 62
End: 2022-10-01

## 2022-10-07 ENCOUNTER — PATIENT OUTREACH (OUTPATIENT)
Dept: FAMILY MEDICINE | Facility: CLINIC | Age: 62
End: 2022-10-07

## 2022-10-07 DIAGNOSIS — R87.810 CERVICAL HIGH RISK HPV (HUMAN PAPILLOMAVIRUS) TEST POSITIVE: ICD-10-CM

## 2022-10-07 NOTE — LETTER
October 7, 2022      Grace Amaro  [address]   [place] [address]        Dear ,    This letter is to remind you that you are due for your follow-up Pap smear and Human Papillomavirus (HPV) test.    Please call [facility] at [phone] to schedule your appointment at your earliest convenience.    If you have completed the appointment outside of the [facility] system, please have the records forwarded to our office. We will update your chart for your provider to review before your next annual wellness visit.     Thank you for choosing [facility]!      Sincerely,    Your [facility] Care Team

## 2022-11-07 NOTE — TELEPHONE ENCOUNTER
11/07/22 called [facility] and they said that they recieved a referral but the pt hasn't been seen there yet.     Patient due for Pap and HPV.    Reminder done today via telephone call.

## 2022-12-06 NOTE — TELEPHONE ENCOUNTER
Sandro Pappas,   Patient is lost to pap tracking follow-up. Attempts to contact pt have been made per reminder process and there has been no reply and/or no appt scheduled.  If you are wanting any additional contact attempts please send to your care team staff.       Pap Hx:  9/2017 NILM pap with Positive HPV. No follow-up  8/6/21 ASCUS pap, +HR HPV (not 16/18). Plan colp due before 11/6/2021  9/3/21 Colpo bx and ECC JEZ I. Plan: cotest in 1 year  09/14/22 appt--notes added pap not done, Ob/Gyn referral given   10/07/22 Reminder Rashaunt, Letter  11/07/22 called [facility] and they said that they recieved a referral but the pt hasn't been seen there yet.   11/07/22 Reminder call-harjit  12/6/22 Lost to follow-up for pap tracking, butch routed to provider

## 2022-12-12 NOTE — TELEPHONE ENCOUNTER
"Left message to call back for: patient  Information to relay to patient: see providers message below and relay-    \"Can we reach out to this patient and encouraged her to schedule a follow-up Pap smear with OB/Gyn.  Referral was placed during her physical in September.\"      "

## 2022-12-14 NOTE — TELEPHONE ENCOUNTER
Left message to call back for: Patient  Information to relay to patient: See RN message below and help schedule.

## 2023-06-13 ENCOUNTER — OFFICE VISIT (OUTPATIENT)
Dept: FAMILY MEDICINE | Facility: CLINIC | Age: 63
End: 2023-06-13
Payer: COMMERCIAL

## 2023-06-13 VITALS
SYSTOLIC BLOOD PRESSURE: 160 MMHG | RESPIRATION RATE: 16 BRPM | HEIGHT: 62 IN | DIASTOLIC BLOOD PRESSURE: 88 MMHG | OXYGEN SATURATION: 94 % | WEIGHT: 206.38 LBS | TEMPERATURE: 98.3 F | HEART RATE: 59 BPM | BODY MASS INDEX: 37.98 KG/M2

## 2023-06-13 DIAGNOSIS — R03.0 ELEVATED BP WITHOUT DIAGNOSIS OF HYPERTENSION: ICD-10-CM

## 2023-06-13 DIAGNOSIS — D22.9 ATYPICAL MOLE: Primary | ICD-10-CM

## 2023-06-13 DIAGNOSIS — E66.812 CLASS 2 SEVERE OBESITY DUE TO EXCESS CALORIES WITH SERIOUS COMORBIDITY AND BODY MASS INDEX (BMI) OF 38.0 TO 38.9 IN ADULT (H): ICD-10-CM

## 2023-06-13 DIAGNOSIS — E66.01 CLASS 2 SEVERE OBESITY DUE TO EXCESS CALORIES WITH SERIOUS COMORBIDITY AND BODY MASS INDEX (BMI) OF 38.0 TO 38.9 IN ADULT (H): ICD-10-CM

## 2023-06-13 DIAGNOSIS — E78.00 HYPERCHOLESTEROLEMIA: ICD-10-CM

## 2023-06-13 PROCEDURE — 99214 OFFICE O/P EST MOD 30 MIN: CPT | Performed by: FAMILY MEDICINE

## 2023-06-13 ASSESSMENT — ENCOUNTER SYMPTOMS: CONSTITUTIONAL NEGATIVE: 1

## 2023-06-13 NOTE — ASSESSMENT & PLAN NOTE
Left anterior chest.  Approximately dime size with irregular appearance.  Differential does include seborrheic keratoses but I think it is consistent with an atypical mole and a tissue biopsy is indicated.  We reviewed options including shave biopsy versus excisional biopsy.  I referred her to my partner Dr. Baugh for his expertise.  Think the patient would prefer an excisional biopsy.

## 2023-06-13 NOTE — ASSESSMENT & PLAN NOTE
When I spoke with the patient last September, we had planned to check lipids and decide based on the measurement whether or not to resume atorvastatin.  She did not return to clinic for blood work.  We will complete that today.  Anticipate we will be resuming atorvastatin.

## 2023-06-13 NOTE — PROGRESS NOTES
"  Assessment & Plan   Problem List Items Addressed This Visit     Atypical mole - Primary     Left anterior chest.  Approximately dime size with irregular appearance.  Differential does include seborrheic keratoses but I think it is consistent with an atypical mole and a tissue biopsy is indicated.  We reviewed options including shave biopsy versus excisional biopsy.  I referred her to my partner Dr. Baugh for his expertise.  Think the patient would prefer an excisional biopsy.         Class 2 severe obesity due to excess calories with serious comorbidity and body mass index (BMI) of 38.0 to 38.9 in adult (H)    Elevated BP without diagnosis of hypertension     Most recent blood pressure measurement was within normal limits.  The patient return to clinic in approximately 3 weeks for a blood pressure measurement.  If it remains elevated, I would be inclined to start an antihypertensive medication such as losartan.         Hypercholesterolemia     When I spoke with the patient last September, we had planned to check lipids and decide based on the measurement whether or not to resume atorvastatin.  She did not return to clinic for blood work.  We will complete that today.  Anticipate we will be resuming atorvastatin.           BMI:   Estimated body mass index is 38.36 kg/m  as calculated from the following:    Height as of this encounter: 1.562 m (5' 1.5\").    Weight as of this encounter: 93.6 kg (206 lb 6 oz).   Weight management plan: Discussed healthy diet and exercise guidelines    Tobi Martinez MD  [facility] is a 63 year old, presenting for the following health issues:  Mole (On LT upper chest area.), Blood Pressure Check, and Cerumen Impaction        6/13/2023     8:08 AM   Additional Questions   Roomed by sac   Accompanied by self         6/13/2023     8:08 AM   Patient Reported Additional Medications   Patient reports taking the following new medications no " "    Multiple concerns:  - Mole: Growing.  Not bleeding.  Patient is concerned that it is now raised and different than it was previously.  She wonders about tissue biopsy.  - Blood pressure: She does not check on a regular basis at home.  She does have recollection has been elevated in the past.  She otherwise feels well.  No lightheadedness.  No dizziness.  No headaches.  No blurriness of vision.  - Cholesterol: Patient did not return for annual labs following her annual exam earlier this year.  She would be agreeable to starting a medicine if her cholesterol remains elevated.  She is fasting today.    History of Present Illness       Hypertension: She presents for follow up of hypertension.  She does not check blood pressure  regularly outside of the clinic. Outpatient blood pressures have not been over 140/90. She does not follow a low salt diet.     She eats 2-3 servings of fruits and vegetables daily.She consumes 0 sweetened beverage(s) daily.She exercises with enough effort to increase her heart rate 20 to 29 minutes per day.  She exercises with enough effort to increase her heart rate 4 days per week.   She is taking medications regularly.    Review of Systems   Constitutional: Negative.    All other systems reviewed and are negative.         Objective    BP (!) 160/88   Pulse 59   Temp 98.3  F (36.8  C) (Oral)   Resp 16   Ht 1.562 m (5' 1.5\")   Wt 93.6 kg (206 lb 6 oz)   LMP 07/23/2016 (Approximate)   SpO2 94%   BMI 38.36 kg/m    Body mass index is 38.36 kg/m .  Physical Exam  Nursing note reviewed.   Constitutional:       General: She is not in acute distress.     Appearance: Normal appearance. She is not ill-appearing.   HENT:      Head: Normocephalic and atraumatic.   Eyes:      Extraocular Movements: Extraocular movements intact.      Conjunctiva/sclera: Conjunctivae normal.   Pulmonary:      Effort: Pulmonary effort is normal.   Skin:     Comments: Approximately 4 cm to the left of the sternal " border on the anterior left chest is an irregularly shaped approximately 8 mm in diameter raised heterogeneous lesion which is mostly flesh-colored.  Surrounding tissue without abnormalities.  Base of the lesion has a bit of erythema.  No blood.  No pus.   Neurological:      Mental Status: She is alert and oriented to person, place, and time.   Psychiatric:         Attention and Perception: Attention normal.         Mood and Affect: Mood normal.         Speech: Speech normal.         Thought Content: Thought content normal.

## 2023-06-13 NOTE — ASSESSMENT & PLAN NOTE
Most recent blood pressure measurement was within normal limits.  The patient return to clinic in approximately 3 weeks for a blood pressure measurement.  If it remains elevated, I would be inclined to start an antihypertensive medication such as losartan.

## 2023-06-27 ENCOUNTER — ALLIED HEALTH/NURSE VISIT (OUTPATIENT)
Dept: FAMILY MEDICINE | Facility: CLINIC | Age: 63
End: 2023-06-27
Payer: COMMERCIAL

## 2023-06-27 VITALS — DIASTOLIC BLOOD PRESSURE: 105 MMHG | SYSTOLIC BLOOD PRESSURE: 145 MMHG | OXYGEN SATURATION: 96 % | HEART RATE: 60 BPM

## 2023-06-27 DIAGNOSIS — I10 ESSENTIAL HYPERTENSION: Primary | ICD-10-CM

## 2023-06-27 PROCEDURE — 99207 PR NO CHARGE NURSE ONLY: CPT

## 2023-06-27 RX ORDER — LOSARTAN POTASSIUM 50 MG/1
50 TABLET ORAL DAILY
Qty: 30 TABLET | Refills: 2 | Status: SHIPPED | OUTPATIENT
Start: 2023-06-27 | End: 2023-07-18

## 2023-06-27 NOTE — PROGRESS NOTES
Essential hypertension  Blood pressure remains elevated.  Given this result, I recommend antihypertensive therapy.  Losartan 50 mg prescribed and sent to pharmacy.  She will need a blood pressure check in 3 weeks as well as follow-up laboratory testing.  Options include scheduled visit with me or nurse visit/lab visit.

## 2023-06-27 NOTE — PROGRESS NOTES
Left message to call back for: pt  Information to relay to patient: see providers message below, relay, and assist with scheduling follow-up appointment(s)

## 2023-06-27 NOTE — Clinical Note
See note.  Please review medication recommendation with the patient.  Please help schedule follow-up appointment.

## 2023-06-27 NOTE — PROGRESS NOTES
I met with Grace Amaro at the request of Dr. Martinez to recheck her blood pressure.  Blood pressure medications on the med list were reviewed with patient.    Patient has taken all medications as per usual regimen: NA  Patient reports tolerating them without any issues or concerns: NA    Vitals:    06/27/23 0924 06/27/23 0941   BP: (!) 160/99 (!) 145/105   BP Location: Left arm Left arm   Patient Position: Sitting Sitting   Cuff Size: Adult Large Adult Large   Pulse: 60    SpO2: 96%        After 5 minutes, the patient's blood pressure remained greater than or equal to 140/90.    Is the patient currently having any chest pain? No  Does the patient currently have a headache? No  Does the patient currently have any vision changes? No  Does the patient currently have any nausea? No  Does the patient currently have any abdominal pain? No    The previous encounter was reviewed.  The patient was discharged and the note will be sent to the provider for final review.

## 2023-06-27 NOTE — ASSESSMENT & PLAN NOTE
Blood pressure remains elevated.  Given this result, I recommend antihypertensive therapy.  Losartan 50 mg prescribed and sent to pharmacy.  She will need a blood pressure check in 3 weeks as well as follow-up laboratory testing.  Options include scheduled visit with me or nurse visit/lab visit.

## 2023-06-28 ENCOUNTER — TELEPHONE (OUTPATIENT)
Dept: FAMILY MEDICINE | Facility: CLINIC | Age: 63
End: 2023-06-28
Payer: COMMERCIAL

## 2023-06-28 NOTE — TELEPHONE ENCOUNTER
"Left message to call back for: Grace  Information to relay to patient: Please review note below from Dr Martinez and assist patient yulia scheduling an appointment either a lab/nurse only appt or with Dr Martinez.  If she has questions regarding the medication please transfer to nurse    Dr Martinez Note:  Blood pressure remains elevated.  Given this result, I recommend antihypertensive therapy.  Losartan 50 mg prescribed and sent to pharmacy.  She will need a blood pressure check in 3 weeks as well as follow-up laboratory testing.  Options include scheduled visit with me or nurse visit/lab visit    Dr Martinez:  \"Please review medication recommendation with the patient.  Please help schedule follow-up appointment.\"     "

## 2023-07-07 ENCOUNTER — OFFICE VISIT (OUTPATIENT)
Dept: FAMILY MEDICINE | Facility: CLINIC | Age: 63
End: 2023-07-07
Payer: COMMERCIAL

## 2023-07-07 VITALS
DIASTOLIC BLOOD PRESSURE: 89 MMHG | RESPIRATION RATE: 12 BRPM | SYSTOLIC BLOOD PRESSURE: 148 MMHG | HEIGHT: 62 IN | TEMPERATURE: 97.9 F | WEIGHT: 209.5 LBS | BODY MASS INDEX: 38.55 KG/M2 | HEART RATE: 62 BPM | OXYGEN SATURATION: 95 %

## 2023-07-07 DIAGNOSIS — L98.9 SKIN LESION: ICD-10-CM

## 2023-07-07 PROCEDURE — 11402 EXC TR-EXT B9+MARG 1.1-2 CM: CPT | Performed by: FAMILY MEDICINE

## 2023-07-07 PROCEDURE — 88305 TISSUE EXAM BY PATHOLOGIST: CPT | Performed by: PATHOLOGY

## 2023-07-07 NOTE — PROGRESS NOTES
noteSUBJECTIVE:   Grace Amaro is a 63 year old female who presents today for lesion(s) excision. She has 1 lesion(s) that she would like removed. They have been changing. They have become irritated by seatbelt wear.      OBJECTIVE:   Exam shows a 2.5 cm hyperpigmented lesion(s), is raised, on the anterior left upper chest wall. There is evidence of irritation with surrounding redness.     Discussed with Grace regarding technique, risk of infection, and scarring. Betadine is used for cleansing. Lidocaine with epinephrine is used for anesthesia. The lesion(s)were removed with an elliptical excision. 4 sutures of 4-0 Ethicon are placed. Bandage applied. Grace tolerated procedure well. < 1 ml EBL. No complications.    ASSESSMENT:   lesion(s) excision.    PLAN:   Specimens submitted to pathology. Wound care instructions given. Return in 10-14 days for suture removal.

## 2023-07-12 LAB
PATH REPORT.COMMENTS IMP SPEC: NORMAL
PATH REPORT.COMMENTS IMP SPEC: NORMAL
PATH REPORT.FINAL DX SPEC: NORMAL
PATH REPORT.GROSS SPEC: NORMAL
PATH REPORT.MICROSCOPIC SPEC OTHER STN: NORMAL
PATH REPORT.RELEVANT HX SPEC: NORMAL

## 2023-07-18 ENCOUNTER — ALLIED HEALTH/NURSE VISIT (OUTPATIENT)
Dept: FAMILY MEDICINE | Facility: CLINIC | Age: 63
End: 2023-07-18
Payer: COMMERCIAL

## 2023-07-18 ENCOUNTER — LAB (OUTPATIENT)
Dept: LAB | Facility: CLINIC | Age: 63
End: 2023-07-18
Payer: COMMERCIAL

## 2023-07-18 VITALS — DIASTOLIC BLOOD PRESSURE: 84 MMHG | SYSTOLIC BLOOD PRESSURE: 133 MMHG | OXYGEN SATURATION: 96 % | HEART RATE: 65 BPM

## 2023-07-18 DIAGNOSIS — I10 ESSENTIAL HYPERTENSION: ICD-10-CM

## 2023-07-18 DIAGNOSIS — E78.00 HYPERCHOLESTEROLEMIA: ICD-10-CM

## 2023-07-18 DIAGNOSIS — R73.03 PREDIABETES: ICD-10-CM

## 2023-07-18 DIAGNOSIS — L98.9 SKIN LESION: Primary | ICD-10-CM

## 2023-07-18 DIAGNOSIS — Z13.1 SCREENING FOR DIABETES MELLITUS: ICD-10-CM

## 2023-07-18 DIAGNOSIS — R73.01 ELEVATED FASTING GLUCOSE: Primary | ICD-10-CM

## 2023-07-18 LAB
ALT SERPL W P-5'-P-CCNC: 16 U/L (ref 0–50)
ANION GAP SERPL CALCULATED.3IONS-SCNC: 11 MMOL/L (ref 7–15)
BUN SERPL-MCNC: 13.3 MG/DL (ref 8–23)
CALCIUM SERPL-MCNC: 9 MG/DL (ref 8.8–10.2)
CHLORIDE SERPL-SCNC: 103 MMOL/L (ref 98–107)
CHOLEST SERPL-MCNC: 233 MG/DL
CREAT SERPL-MCNC: 0.64 MG/DL (ref 0.51–0.95)
DEPRECATED HCO3 PLAS-SCNC: 27 MMOL/L (ref 22–29)
GFR SERPL CREATININE-BSD FRML MDRD: >90 ML/MIN/1.73M2
GLUCOSE SERPL-MCNC: 124 MG/DL (ref 70–99)
HDLC SERPL-MCNC: 74 MG/DL
HOLD SPECIMEN: NORMAL
LDLC SERPL CALC-MCNC: 143 MG/DL
NONHDLC SERPL-MCNC: 159 MG/DL
POTASSIUM SERPL-SCNC: 4.7 MMOL/L (ref 3.4–5.3)
SODIUM SERPL-SCNC: 141 MMOL/L (ref 136–145)
TRIGL SERPL-MCNC: 81 MG/DL

## 2023-07-18 PROCEDURE — 99207 PR NO CHARGE NURSE ONLY: CPT

## 2023-07-18 PROCEDURE — 36415 COLL VENOUS BLD VENIPUNCTURE: CPT

## 2023-07-18 PROCEDURE — 80048 BASIC METABOLIC PNL TOTAL CA: CPT

## 2023-07-18 PROCEDURE — 84460 ALANINE AMINO (ALT) (SGPT): CPT

## 2023-07-18 PROCEDURE — 80061 LIPID PANEL: CPT

## 2023-07-18 PROCEDURE — 83036 HEMOGLOBIN GLYCOSYLATED A1C: CPT

## 2023-07-18 RX ORDER — LOSARTAN POTASSIUM 50 MG/1
50 TABLET ORAL DAILY
Qty: 90 TABLET | Refills: 1 | Status: SHIPPED | OUTPATIENT
Start: 2023-07-18 | End: 2024-02-02

## 2023-07-18 NOTE — PROGRESS NOTES
I met with Grace RIVERA Darelltorito at the request of Dr. Martinez to recheck her blood pressure.  Blood pressure medications on the med list were reviewed with patient.    Patient has taken all medications as per usual regimen: Yes  Patient reports tolerating them without any issues or concerns: Yes    Vitals:    07/18/23 0924 07/18/23 0936   BP: (!) 135/92 133/84   BP Location: Right arm Right arm   Patient Position: Sitting Sitting   Cuff Size: Adult Large Adult Large   Pulse: 65    SpO2: 96%        After 5 minutes, the patient's blood pressure was <140/90, the previous encounter was reviewed, recorded blood pressure below 140/90. Patient was discharged and the note will be sent to the provider for final review.            Grace Amaro presents to the clinic today for removal of sutures.  The patient has had the sutures in place for 11 days. There has been no history of infection or drainage. 4 sutures are seen located on the anterior left upper chest wall.  The wound is healing well with no signs of infection.  Tetanus status is up to date.   All sutures were easily removed today.  Routine wound care discussed.  The patient will follow up as needed.

## 2023-07-19 LAB — HBA1C MFR BLD: 5.8 % (ref 0–5.6)

## 2023-07-29 PROBLEM — R73.03 PREDIABETES: Status: ACTIVE | Noted: 2023-07-29

## 2023-07-29 RX ORDER — ATORVASTATIN CALCIUM 20 MG/1
20 TABLET, FILM COATED ORAL DAILY
Qty: 90 TABLET | Refills: 3 | Status: SHIPPED | OUTPATIENT
Start: 2023-07-29 | End: 2024-06-17

## 2023-08-29 ENCOUNTER — OFFICE VISIT (OUTPATIENT)
Dept: FAMILY MEDICINE | Facility: CLINIC | Age: 63
End: 2023-08-29
Payer: COMMERCIAL

## 2023-08-29 ENCOUNTER — TELEPHONE (OUTPATIENT)
Dept: FAMILY MEDICINE | Facility: CLINIC | Age: 63
End: 2023-08-29

## 2023-08-29 VITALS
OXYGEN SATURATION: 95 % | RESPIRATION RATE: 14 BRPM | WEIGHT: 213 LBS | BODY MASS INDEX: 39.2 KG/M2 | HEART RATE: 67 BPM | SYSTOLIC BLOOD PRESSURE: 156 MMHG | HEIGHT: 62 IN | TEMPERATURE: 98.3 F | DIASTOLIC BLOOD PRESSURE: 101 MMHG

## 2023-08-29 DIAGNOSIS — H61.23 BILATERAL IMPACTED CERUMEN: Primary | ICD-10-CM

## 2023-08-29 DIAGNOSIS — Z12.11 SCREEN FOR COLON CANCER: ICD-10-CM

## 2023-08-29 DIAGNOSIS — I10 BENIGN ESSENTIAL HYPERTENSION: ICD-10-CM

## 2023-08-29 PROCEDURE — 69209 REMOVE IMPACTED EAR WAX UNI: CPT | Mod: 50 | Performed by: NURSE PRACTITIONER

## 2023-08-29 PROCEDURE — 99214 OFFICE O/P EST MOD 30 MIN: CPT | Mod: 25 | Performed by: NURSE PRACTITIONER

## 2023-08-29 RX ORDER — HYDROCHLOROTHIAZIDE 12.5 MG/1
12.5 TABLET ORAL DAILY
Qty: 90 TABLET | Refills: 0 | Status: SHIPPED | OUTPATIENT
Start: 2023-08-29 | End: 2023-12-13

## 2023-08-29 NOTE — PROGRESS NOTES
"  Assessment & Plan     Screen for colon cancer    - Colonoscopy Screening  Referral    Bilateral impacted cerumen    - OR REMOVAL IMPACTED CERUMEN IRRIGATION/LVG UNILAT    Benign essential hypertension    - hydrochlorothiazide (HYDRODIURIL) 12.5 MG tablet  Dispense: 90 tablet; Refill: 0    - has been taking medications as directed and following up with the nurse. BP is still elevated, despite checking it three times today. Hydrochlorothiazide was added and she will see nurse in two weeks. Can increase to 25 mg per day if above the goal. Lifestyle changes discussed. She will continue to follow up with PCP. BMP stable  - Ear lavaged by rooming staff and ears appeared wnl afterwards.                  ORLIN Dockery CNP  [facility]    Mya Edwards is a 63 year old, presenting for the following health issues:  Ear Problem (Ear wash both ears)      8/29/2023     2:29 PM   Additional Questions   Roomed by Lb       History of Present Illness       Reason for visit:  Ear wash  Symptom onset:  1-2 weeks ago    She eats 2-3 servings of fruits and vegetables daily.She exercises with enough effort to increase her heart rate 20 to 29 minutes per day.    She is taking medications regularly.               Review of Systems   Constitutional: Negative.    HENT:  Negative for ear discharge, ear pain, facial swelling and hearing loss.         Plugged ears   All other systems reviewed and are negative.           Objective    BP (!) 156/101   Pulse 67   Temp 98.3  F (36.8  C) (Oral)   Resp 14   Ht 1.562 m (5' 1.5\")   Wt 96.6 kg (213 lb)   LMP 07/23/2016 (Approximate)   SpO2 95%   BMI 39.59 kg/m    Body mass index is 39.59 kg/m .  Physical Exam  Constitutional:       Appearance: Normal appearance. She is obese.   HENT:      Right Ear: Tympanic membrane and external ear normal. There is impacted cerumen.      Left Ear: Tympanic membrane normal. There is impacted cerumen. "   Pulmonary:      Effort: Pulmonary effort is normal.   Musculoskeletal:      Comments: No pitting edema noted   Skin:     General: Skin is warm.      Capillary Refill: Capillary refill takes less than 2 seconds.   Neurological:      General: No focal deficit present.      Mental Status: She is alert.   Psychiatric:         Mood and Affect: Mood normal.         Behavior: Behavior normal.         Thought Content: Thought content normal.         Judgment: Judgment normal.            Lab on 07/18/2023   Component Date Value Ref Range Status    Sodium 07/18/2023 141  136 - 145 mmol/L Final    Potassium 07/18/2023 4.7  3.4 - 5.3 mmol/L Final    Chloride 07/18/2023 103  98 - 107 mmol/L Final    Carbon Dioxide (CO2) 07/18/2023 27  22 - 29 mmol/L Final    Anion Gap 07/18/2023 11  7 - 15 mmol/L Final    Urea Nitrogen 07/18/2023 13.3  8.0 - 23.0 mg/dL Final    Creatinine 07/18/2023 0.64  0.51 - 0.95 mg/dL Final    Calcium 07/18/2023 9.0  8.8 - 10.2 mg/dL Final    Glucose 07/18/2023 124 (H)  70 - 99 mg/dL Final    GFR Estimate 07/18/2023 >90  >60 mL/min/1.73m2 Final    Cholesterol 07/18/2023 233 (H)  <200 mg/dL Final    Triglycerides 07/18/2023 81  <150 mg/dL Final    Direct Measure HDL 07/18/2023 74  >=50 mg/dL Final    LDL Cholesterol Calculated 07/18/2023 143 (H)  <=100 mg/dL Final    Non HDL Cholesterol 07/18/2023 159 (H)  <130 mg/dL Final    ALT 07/18/2023 16  0 - 50 U/L Final    Reference intervals for this test were updated on 6/12/2023 to more accurately reflect our healthy population. There may be differences in the flagging of prior results with similar values performed with this method. Interpretation of those prior results can be made in the context of the updated reference intervals.      Hold Specimen 07/18/2023 [facility]   Final    Hemoglobin A1C 07/18/2023 5.8 (H)  0.0 - 5.6 % Final    Normal <5.7%   Prediabetes 5.7-6.4%    Diabetes 6.5% or higher     Note: Adopted from ADA consensus guidelines.

## 2023-08-29 NOTE — TELEPHONE ENCOUNTER
LM for patient regarding today's appointment as it can be scheduled on the MA schedule instead of an office with a  provider if the only concern is an ear wash. Left callback #.

## 2023-08-29 NOTE — PROCEDURES
Patient identified using two patient identifiers.  Ear exam showing wax occlusion completed by provider.  H202/H20 was placed in the both ear(s) via irrigation tool: elephant ear    Ears were doubled check by Lakia Asher CMA.

## 2023-08-30 ASSESSMENT — ENCOUNTER SYMPTOMS
CONSTITUTIONAL NEGATIVE: 1
FACIAL SWELLING: 0

## 2023-10-06 ENCOUNTER — TELEPHONE (OUTPATIENT)
Dept: FAMILY MEDICINE | Facility: CLINIC | Age: 63
End: 2023-10-06

## 2023-10-06 NOTE — TELEPHONE ENCOUNTER
Patient Quality Outreach    Patient is due for the following:   Hypertension -  BP check    Next Steps:   Schedule a nurse only visit for BP check    Type of outreach:    Phone, left message for patient/parent to call back.      Questions for provider review:    None           Jerardo Morel MA  Chart routed to Care Team.

## 2023-10-15 ENCOUNTER — HEALTH MAINTENANCE LETTER (OUTPATIENT)
Age: 63
End: 2023-10-15

## 2023-10-25 DIAGNOSIS — F51.01 PRIMARY INSOMNIA: ICD-10-CM

## 2023-10-26 RX ORDER — QUETIAPINE FUMARATE 300 MG/1
300 TABLET, FILM COATED ORAL AT BEDTIME
Qty: 90 TABLET | Refills: 0 | Status: SHIPPED | OUTPATIENT
Start: 2023-10-26 | End: 2023-10-27

## 2023-10-27 DIAGNOSIS — F51.01 PRIMARY INSOMNIA: ICD-10-CM

## 2023-10-27 RX ORDER — QUETIAPINE FUMARATE 300 MG/1
300 TABLET, FILM COATED ORAL AT BEDTIME
Qty: 90 TABLET | Refills: 0 | Status: SHIPPED | OUTPATIENT
Start: 2023-10-27 | End: 2024-01-23

## 2023-11-06 NOTE — TELEPHONE ENCOUNTER
Patient Quality Outreach    Patient is due for the following:   Hypertension -  BP check  Colon Cancer Screening  Physical Annual Wellness Visit    Next Steps:   Schedule a Annual Wellness Visit    Type of outreach:    Sent [employer] message.      Questions for provider review:    None           Jerardo Morel MA  Chart routed to Care Team.

## 2023-12-13 DIAGNOSIS — I10 BENIGN ESSENTIAL HYPERTENSION: ICD-10-CM

## 2023-12-13 RX ORDER — HYDROCHLOROTHIAZIDE 12.5 MG/1
12.5 TABLET ORAL DAILY
Qty: 90 TABLET | Refills: 0 | Status: SHIPPED | OUTPATIENT
Start: 2023-12-13 | End: 2024-03-18

## 2023-12-15 NOTE — TELEPHONE ENCOUNTER
3rd attempted  Unable to get a hold of pt    Patient Quality Outreach    Patient is due for the following:   Hypertension -  BP check  Colon Cancer Screening  Physical Annual Wellness Visit    Next Steps:   Schedule a Annual Wellness Visit    Type of outreach:    Phone, left message for patient/parent to call back.      Questions for provider review:    None           Jerardo Morel MA

## 2024-01-09 ENCOUNTER — PATIENT OUTREACH (OUTPATIENT)
Dept: CARE COORDINATION | Facility: CLINIC | Age: 64
End: 2024-01-09
Payer: COMMERCIAL

## 2024-01-11 ENCOUNTER — TELEPHONE (OUTPATIENT)
Dept: FAMILY MEDICINE | Facility: CLINIC | Age: 64
End: 2024-01-11

## 2024-01-11 NOTE — LETTER
Grace Amaro     [address]   [place] [address]       3/12/2024    Dear Grace,     In reviewing your records, we have determined a gap in your preventive services. Based on your age and health history, we recommend the follow service.       Physical with a Pap Smear  Colon cancer screening  Blood pressure/cardiovascular check      If you have had the service elsewhere, please contact us so we can update our records. Please let us know if you have transferred your care to another clinic.    Please call [phone] to schedule this appointment.    We believe that a strong preventive care program, including regular physicals and follow-up care is an important part of a healthy lifestyle and we are committed to helping you maintain your health.    Thank you for choosing us as your health care provider.    Sincerely,     [facility]

## 2024-01-11 NOTE — TELEPHONE ENCOUNTER
Patient Quality Outreach    Patient is due for the following:   Hypertension -  BP check  Colon Cancer Screening  Cervical Cancer Screening - PAP Needed  Physical Annual Wellness Visit    Next Steps:   Schedule a Annual Wellness Visit    Type of outreach:    Sent [employer] message.      Questions for provider review:    None           Jerardo Morel MA  Chart routed to Care Team.

## 2024-01-23 DIAGNOSIS — F51.01 PRIMARY INSOMNIA: ICD-10-CM

## 2024-01-23 RX ORDER — QUETIAPINE FUMARATE 300 MG/1
300 TABLET, FILM COATED ORAL AT BEDTIME
Qty: 90 TABLET | Refills: 0 | Status: SHIPPED | OUTPATIENT
Start: 2024-01-23 | End: 2024-04-18

## 2024-02-02 DIAGNOSIS — I10 ESSENTIAL HYPERTENSION: ICD-10-CM

## 2024-02-02 RX ORDER — LOSARTAN POTASSIUM 50 MG/1
50 TABLET ORAL DAILY
Qty: 90 TABLET | Refills: 1 | Status: SHIPPED | OUTPATIENT
Start: 2024-02-02 | End: 2024-06-17

## 2024-02-13 NOTE — TELEPHONE ENCOUNTER
2nd Attempted    Patient Quality Outreach    Patient is due for the following:   Hypertension -  BP check  Colon Cancer Screening  Cervical Cancer Screening - PAP Needed  Physical Annual Wellness Visit    Next Steps:   Schedule a Annual Wellness Visit    Type of outreach:    Phone, left message for patient/parent to call back.    Next Steps:  Reach out within 90 days via Letter.    Max number of attempts reached: No. Will try again in 90 days if patient still on fail list.    Questions for provider review:    None           Jerardo Morel MA  Chart routed to Care Team.

## 2024-03-12 NOTE — TELEPHONE ENCOUNTER
3rd attempted  Patient Quality Outreach    Patient is due for the following:   Hypertension -  BP check  Colon Cancer Screening  Cervical Cancer Screening - PAP Needed  Physical Annual Wellness Visit    Next Steps:   Schedule a Annual Wellness Visit    Type of outreach:    Phone, left message for patient/parent to call back. and Sent letter.    Next Steps:  Reach out within 90 days via Phone.    Max number of attempts reached: Yes. Will try again in 90 days if patient still on fail list.    Questions for provider review:    None           Jerardo Morel MA

## 2024-03-18 DIAGNOSIS — I10 BENIGN ESSENTIAL HYPERTENSION: ICD-10-CM

## 2024-03-18 RX ORDER — HYDROCHLOROTHIAZIDE 12.5 MG/1
12.5 TABLET ORAL DAILY
Qty: 90 TABLET | Refills: 0 | Status: SHIPPED | OUTPATIENT
Start: 2024-03-18 | End: 2024-06-17

## 2024-04-18 DIAGNOSIS — F51.01 PRIMARY INSOMNIA: ICD-10-CM

## 2024-04-18 RX ORDER — QUETIAPINE FUMARATE 300 MG/1
TABLET, FILM COATED ORAL
Qty: 90 TABLET | Refills: 0 | Status: SHIPPED | OUTPATIENT
Start: 2024-04-18 | End: 2024-06-17

## 2024-05-12 ENCOUNTER — HEALTH MAINTENANCE LETTER (OUTPATIENT)
Age: 64
End: 2024-05-12

## 2024-05-13 ENCOUNTER — TELEPHONE (OUTPATIENT)
Dept: FAMILY MEDICINE | Facility: CLINIC | Age: 64
End: 2024-05-13
Payer: COMMERCIAL

## 2024-05-13 DIAGNOSIS — H61.23 BILATERAL IMPACTED CERUMEN: Primary | ICD-10-CM

## 2024-05-13 NOTE — TELEPHONE ENCOUNTER
Order/Referral Request    Who is requesting: PT     Orders being requested: Ear cleaning with nurse     Reason service is needed/diagnosis: Clogged with wax    When are orders needed by: asap     Has this been discussed with Provider: No but has had it done before     Does patient have a preference on a Group/Provider/Facility? [facility]     Does patient have an appointment scheduled?: No    Where to send orders: Place orders within Epic    Could we send this information to you in [facility] or would you prefer to receive a phone call?:   Patient would prefer a phone call   Okay to leave a detailed message?: Yes at Cell number on file:    Telephone Information:   Mobile [phone]

## 2024-05-14 NOTE — TELEPHONE ENCOUNTER
Left message to call back for: Patient  Information to relay to patient: Please notify patient order has been placed and schedule MA visit for ear cleaning.

## 2024-05-21 ENCOUNTER — ALLIED HEALTH/NURSE VISIT (OUTPATIENT)
Dept: FAMILY MEDICINE | Facility: CLINIC | Age: 64
End: 2024-05-21
Payer: COMMERCIAL

## 2024-05-21 DIAGNOSIS — H61.23 BILATERAL IMPACTED CERUMEN: ICD-10-CM

## 2024-05-21 PROCEDURE — 69209 REMOVE IMPACTED EAR WAX UNI: CPT | Mod: 50

## 2024-05-21 NOTE — PROGRESS NOTES
RN ear assessment completed prior to ear irrigation. Otoscopic exam reveals cerumen present and irrigation advised. Post Ear Irrigation exam completed and cerumen plug removed, tympanic membrane intact, and no bleeding noted.  Pain assessment completed.     Patient tolerated procedure:  yes

## 2024-06-14 ENCOUNTER — TELEPHONE (OUTPATIENT)
Dept: FAMILY MEDICINE | Facility: CLINIC | Age: 64
End: 2024-06-14
Payer: COMMERCIAL

## 2024-06-14 NOTE — TELEPHONE ENCOUNTER
Patient Quality Outreach    Patient is due for the following:   Hypertension -  BP check  Colon Cancer Screening  Breast Cancer Screening - Mammogram  Cervical Cancer Screening - PAP Needed  Physical Annual Wellness Visit    Next Steps:   Patient has upcoming appointment, these items will be addressed at that time.  Next AWV appt x 6/17/24  Type of outreach:    Chart review performed, no outreach needed.      Questions for provider review:    None           Jerardo Morel MA

## 2024-06-17 ENCOUNTER — OFFICE VISIT (OUTPATIENT)
Dept: FAMILY MEDICINE | Facility: CLINIC | Age: 64
End: 2024-06-17
Payer: COMMERCIAL

## 2024-06-17 VITALS
HEART RATE: 65 BPM | RESPIRATION RATE: 16 BRPM | WEIGHT: 208.6 LBS | TEMPERATURE: 98.3 F | SYSTOLIC BLOOD PRESSURE: 120 MMHG | DIASTOLIC BLOOD PRESSURE: 84 MMHG | HEIGHT: 62 IN | BODY MASS INDEX: 38.39 KG/M2 | OXYGEN SATURATION: 95 %

## 2024-06-17 DIAGNOSIS — R73.03 PREDIABETES: ICD-10-CM

## 2024-06-17 DIAGNOSIS — I10 ESSENTIAL HYPERTENSION: ICD-10-CM

## 2024-06-17 DIAGNOSIS — Z12.31 VISIT FOR SCREENING MAMMOGRAM: ICD-10-CM

## 2024-06-17 DIAGNOSIS — R87.810 CERVICAL HIGH RISK HPV (HUMAN PAPILLOMAVIRUS) TEST POSITIVE: ICD-10-CM

## 2024-06-17 DIAGNOSIS — Z00.00 ENCOUNTER FOR MEDICARE ANNUAL WELLNESS EXAM: Primary | ICD-10-CM

## 2024-06-17 DIAGNOSIS — E78.00 HYPERCHOLESTEROLEMIA: ICD-10-CM

## 2024-06-17 DIAGNOSIS — F51.01 PRIMARY INSOMNIA: ICD-10-CM

## 2024-06-17 DIAGNOSIS — E66.01 CLASS 2 SEVERE OBESITY DUE TO EXCESS CALORIES WITH SERIOUS COMORBIDITY AND BODY MASS INDEX (BMI) OF 38.0 TO 38.9 IN ADULT (H): ICD-10-CM

## 2024-06-17 DIAGNOSIS — Z78.0 ASYMPTOMATIC AGE-RELATED POSTMENOPAUSAL STATE: ICD-10-CM

## 2024-06-17 DIAGNOSIS — Z12.4 CERVICAL CANCER SCREENING: ICD-10-CM

## 2024-06-17 DIAGNOSIS — Z79.899 MEDICATION MANAGEMENT: ICD-10-CM

## 2024-06-17 DIAGNOSIS — Z12.11 SCREEN FOR COLON CANCER: ICD-10-CM

## 2024-06-17 DIAGNOSIS — E66.812 CLASS 2 SEVERE OBESITY DUE TO EXCESS CALORIES WITH SERIOUS COMORBIDITY AND BODY MASS INDEX (BMI) OF 38.0 TO 38.9 IN ADULT (H): ICD-10-CM

## 2024-06-17 DIAGNOSIS — I10 BENIGN ESSENTIAL HYPERTENSION: ICD-10-CM

## 2024-06-17 DIAGNOSIS — E55.9 VITAMIN D DEFICIENCY: ICD-10-CM

## 2024-06-17 LAB
ALT SERPL W P-5'-P-CCNC: 24 U/L (ref 0–50)
ANION GAP SERPL CALCULATED.3IONS-SCNC: 9 MMOL/L (ref 7–15)
BUN SERPL-MCNC: 13.9 MG/DL (ref 8–23)
CALCIUM SERPL-MCNC: 9.3 MG/DL (ref 8.8–10.2)
CHLORIDE SERPL-SCNC: 103 MMOL/L (ref 98–107)
CHOLEST SERPL-MCNC: 243 MG/DL
CREAT SERPL-MCNC: 0.66 MG/DL (ref 0.51–0.95)
DEPRECATED HCO3 PLAS-SCNC: 27 MMOL/L (ref 22–29)
EGFRCR SERPLBLD CKD-EPI 2021: >90 ML/MIN/1.73M2
FASTING STATUS PATIENT QL REPORTED: YES
FASTING STATUS PATIENT QL REPORTED: YES
GLUCOSE SERPL-MCNC: 108 MG/DL (ref 70–99)
HBA1C MFR BLD: 5.5 % (ref 0–5.6)
HDLC SERPL-MCNC: 55 MG/DL
LDLC SERPL CALC-MCNC: 169 MG/DL
NONHDLC SERPL-MCNC: 188 MG/DL
POTASSIUM SERPL-SCNC: 4.3 MMOL/L (ref 3.4–5.3)
SODIUM SERPL-SCNC: 139 MMOL/L (ref 135–145)
TRIGL SERPL-MCNC: 97 MG/DL

## 2024-06-17 PROCEDURE — 36415 COLL VENOUS BLD VENIPUNCTURE: CPT | Performed by: FAMILY MEDICINE

## 2024-06-17 PROCEDURE — 83036 HEMOGLOBIN GLYCOSYLATED A1C: CPT | Performed by: FAMILY MEDICINE

## 2024-06-17 PROCEDURE — G0438 PPPS, INITIAL VISIT: HCPCS | Performed by: FAMILY MEDICINE

## 2024-06-17 PROCEDURE — 80048 BASIC METABOLIC PNL TOTAL CA: CPT | Performed by: FAMILY MEDICINE

## 2024-06-17 PROCEDURE — 99214 OFFICE O/P EST MOD 30 MIN: CPT | Mod: 25 | Performed by: FAMILY MEDICINE

## 2024-06-17 PROCEDURE — 80061 LIPID PANEL: CPT | Performed by: FAMILY MEDICINE

## 2024-06-17 PROCEDURE — 84460 ALANINE AMINO (ALT) (SGPT): CPT | Performed by: FAMILY MEDICINE

## 2024-06-17 RX ORDER — ATORVASTATIN CALCIUM 20 MG/1
20 TABLET, FILM COATED ORAL DAILY
Qty: 90 TABLET | Refills: 3 | Status: SHIPPED | OUTPATIENT
Start: 2024-06-17

## 2024-06-17 RX ORDER — RESPIRATORY SYNCYTIAL VIRUS VACCINE 120MCG/0.5
0.5 KIT INTRAMUSCULAR ONCE
Qty: 1 EACH | Refills: 0 | Status: CANCELLED | OUTPATIENT
Start: 2024-06-17 | End: 2024-06-17

## 2024-06-17 RX ORDER — LOSARTAN POTASSIUM 50 MG/1
50 TABLET ORAL DAILY
Qty: 90 TABLET | Refills: 3 | Status: SHIPPED | OUTPATIENT
Start: 2024-06-17

## 2024-06-17 RX ORDER — HYDROCHLOROTHIAZIDE 12.5 MG/1
12.5 TABLET ORAL DAILY
Qty: 90 TABLET | Refills: 3 | Status: SHIPPED | OUTPATIENT
Start: 2024-06-17

## 2024-06-17 RX ORDER — QUETIAPINE FUMARATE 300 MG/1
300 TABLET, FILM COATED ORAL DAILY
Qty: 90 TABLET | Refills: 0 | Status: SHIPPED | OUTPATIENT
Start: 2024-06-17 | End: 2024-09-06

## 2024-06-17 RX ORDER — ATORVASTATIN CALCIUM 20 MG/1
20 TABLET, FILM COATED ORAL DAILY
Qty: 90 TABLET | Refills: 3 | Status: SHIPPED | OUTPATIENT
Start: 2024-06-17 | End: 2024-06-17

## 2024-06-17 RX ORDER — HYDROCHLOROTHIAZIDE 12.5 MG/1
12.5 TABLET ORAL DAILY
Qty: 90 TABLET | Refills: 3 | Status: SHIPPED | OUTPATIENT
Start: 2024-06-17 | End: 2024-06-17

## 2024-06-17 RX ORDER — QUETIAPINE FUMARATE 300 MG/1
300 TABLET, FILM COATED ORAL DAILY
Qty: 90 TABLET | Refills: 0 | Status: SHIPPED | OUTPATIENT
Start: 2024-06-17 | End: 2024-06-17

## 2024-06-17 RX ORDER — LOSARTAN POTASSIUM 50 MG/1
50 TABLET ORAL DAILY
Qty: 90 TABLET | Refills: 3 | Status: SHIPPED | OUTPATIENT
Start: 2024-06-17 | End: 2024-06-17

## 2024-06-17 SDOH — HEALTH STABILITY: PHYSICAL HEALTH: ON AVERAGE, HOW MANY DAYS PER WEEK DO YOU ENGAGE IN MODERATE TO STRENUOUS EXERCISE (LIKE A BRISK WALK)?: 4 DAYS

## 2024-06-17 ASSESSMENT — ENCOUNTER SYMPTOMS
BACK PAIN: 0
ABDOMINAL PAIN: 0
CHILLS: 0
ARTHRALGIAS: 0
HEMATURIA: 0
VOMITING: 0
SEIZURES: 0
FEVER: 0
COLOR CHANGE: 0
SHORTNESS OF BREATH: 0
COUGH: 0
SORE THROAT: 0
DYSURIA: 0
EYE PAIN: 0
PALPITATIONS: 0

## 2024-06-17 ASSESSMENT — SOCIAL DETERMINANTS OF HEALTH (SDOH): HOW OFTEN DO YOU GET TOGETHER WITH FRIENDS OR RELATIVES?: TWICE A WEEK

## 2024-06-17 NOTE — ASSESSMENT & PLAN NOTE
We will check a hemoglobin A1c with today's blood draw.  Based on her description of lifestyle improvements and physical activity anticipate improvement.

## 2024-06-17 NOTE — PATIENT INSTRUCTIONS
"I sent Seroquel through [facility].  I think the home delivery option will be less expensive than filling it at [facility].    Mammogram: This likely will happen at [facility].    Colonoscopy: Most likely would happen at [facility] in [place].    I referred you to [facility] in [place].     I think you should get the shingles vaccine.  Please check with your insurance plan regarding coverage (how much it cost) and location of where you should receive it.    Patient Education   Preventive Care Advice   This is general advice we often give to help people stay healthy. Your care team may have specific advice just for you. Please talk to your care team about your own preventive care needs.  Lifestyle  Exercise at least 150 minutes each week (30 minutes a day, 5 days a week).  Do muscle strengthening activities 2 days a week. These help control your weight and prevent disease.  No smoking.  Wear sunscreen to prevent skin cancer.  Have your home tested for radon every 2 to 5 years. Radon is a colorless, odorless gas that can harm your lungs. To learn more, go to www.health.[facility].mn. and search for \"Radon in Homes.\"  Keep guns unloaded and locked up in a safe place like a safe or gun vault, or, use a gun lock and hide the keys. Always lock away bullets separately. To learn more, visit [employer].[place].gov and search for \"safe gun storage.\"  Nutrition  Eat 5 or more servings of fruits and vegetables each day.  Try wheat bread, brown rice and whole grain pasta (instead of white bread, rice, and pasta).  Get enough calcium and vitamin D. Check the label on foods and aim for 100% of the RDA (recommended daily allowance).  Regular exams  Have a dental exam and cleaning every 6 months.  See your health care team every year to talk about:  Any changes in your health.  Any medicines your care team has prescribed.  Preventive care, family planning, and ways to prevent chronic diseases.  Shots (vaccines)   HPV shots (up to age 26), if " you've never had them before.  Hepatitis B shots (up to age 59), if you've never had them before.  COVID-19 shot: Get this shot when it's due.  Flu shot: Get a flu shot every year.  Tetanus shot: Get a tetanus shot every 10 years.  Pneumococcal, hepatitis A, and RSV shots: Ask your care team if you need these based on your risk.  Shingles shot (for age 50 and up).  General health tests  Diabetes screening:  Starting at age 35, Get screened for diabetes at least every 3 years.  If you are younger than age 35, ask your care team if you should be screened for diabetes.  Cholesterol test: At age 39, start having a cholesterol test every 5 years, or more often if advised.  Bone density scan (DEXA): At age 50, ask your care team if you should have this scan for osteoporosis (brittle bones).  Hepatitis C: Get tested at least once in your life.  Abdominal aortic aneurysm screening: Talk to your doctor about having this screening if you:  Have ever smoked; and  Are biologically male; and  Are between the ages of 65 and 75.  STIs (sexually transmitted infections)  Before age 24: Ask your care team if you should be screened for STIs.  After age 24: Get screened for STIs if you're at risk. You are at risk for STIs (including HIV) if:  You are sexually active with more than one person.  You don't use condoms every time.  You or a partner was diagnosed with a sexually transmitted infection.  If you are at risk for HIV, ask about PrEP medicine to prevent HIV.  Get tested for HIV at least once in your life, whether you are at risk for HIV or not.  Cancer screening tests  Cervical cancer screening: If you have a cervix, begin getting regular cervical cancer screening tests at age 21. Most people who have regular screenings with normal results can stop after age 65. Talk about this with your provider.  Breast cancer scan (mammogram): If you've ever had breasts, begin having regular mammograms starting at age 40. This is a scan to  check for breast cancer.  Colon cancer screening: It is important to start screening for colon cancer at age 45.  Have a colonoscopy test every 10 years (or more often if you're at risk) Or, ask your provider about stool tests like a FIT test every year or Cologuard test every 3 years.  To learn more about your testing options, visit: www.[employer]/904619.pdf.  For help making a decision, visit: ranjan/tb46179.  Prostate cancer screening test: If you have a prostate and are age 55 to 69, ask your provider if you would benefit from a yearly prostate cancer screening test.  Lung cancer screening: If you are a current or former smoker age 50 to 80, ask your care team if ongoing lung cancer screenings are right for you.  For informational purposes only. Not to replace the advice of your health care provider. Copyright   2023 [employer]. All rights reserved. Clinically reviewed by the [facility] Transitions [facility]. [employer] 238596 - REV 04/24.  Learning About Stress  What is stress?     Stress is your body's response to a hard situation. Your body can have a physical, emotional, or mental response. Stress is a fact of life for most people, and it affects everyone differently. What causes stress for you may not be stressful for someone else.  A lot of things can cause stress. You may feel stress when you go on a job interview, take a test, or run a race. This kind of short-term stress is normal and even useful. It can help you if you need to work hard or react quickly. For example, stress can help you finish an important job on time.  Long-term stress is caused by ongoing stressful situations or events. Examples of long-term stress include long-term health problems, ongoing problems at work, or conflicts in your family. Long-term stress can harm your health.  How does stress affect your health?  When you are stressed, your body responds as though you are in danger. It makes hormones that speed up  your heart, make you breathe faster, and give you a burst of energy. This is called the fight-or-flight stress response. If the stress is over quickly, your body goes back to normal and no harm is done.  But if stress happens too often or lasts too long, it can have bad effects. Long-term stress can make you more likely to get sick, and it can make symptoms of some diseases worse. If you tense up when you are stressed, you may develop neck, shoulder, or low back pain. Stress is linked to high blood pressure and heart disease.  Stress also harms your emotional health. It can make you elam, tense, or depressed. Your relationships may suffer, and you may not do well at work or school.  What can you do to manage stress?  You can try these things to help manage stress:   Do something active. Exercise or activity can help reduce stress. Walking is a great way to get started. Even everyday activities such as housecleaning or yard work can help.  Try yoga or david chi. These techniques combine exercise and meditation. You may need some training at first to learn them.  Do something you enjoy. For example, listen to music or go to a movie. Practice your hobby or do volunteer work.  Meditate. This can help you relax, because you are not worrying about what happened before or what may happen in the future.  Do guided imagery. Imagine yourself in any setting that helps you feel calm. You can use online videos, books, or a teacher to guide you.  Do breathing exercises. For example:  From a standing position, bend forward from the waist with your knees slightly bent. Let your arms dangle close to the floor.  Breathe in slowly and deeply as you return to a standing position. Roll up slowly and lift your head last.  Hold your breath for just a few seconds in the standing position.  Breathe out slowly and bend forward from the waist.  Let your feelings out. Talk, laugh, cry, and express anger when you need to. Talking with supportive  "friends or family, a counselor, or a rafael leader about your feelings is a healthy way to relieve stress. Avoid discussing your feelings with people who make you feel worse.  Write. It may help to write about things that are bothering you. This helps you find out how much stress you feel and what is causing it. When you know this, you can find better ways to cope.  What can you do to prevent stress?  You might try some of these things to help prevent stress:  Manage your time. This helps you find time to do the things you want and need to do.  Get enough sleep. Your body recovers from the stresses of the day while you are sleeping.  Get support. Your family, friends, and community can make a difference in how you experience stress.  Limit your news feed. Avoid or limit time on social media or news that may make you feel stressed.  Do something active. Exercise or activity can help reduce stress. Walking is a great way to get started.  Where can you learn more?  Go to https://www.[facility].net/patiented  Enter N032 in the search box to learn more about \"Learning About Stress.\"  Current as of: October 24, 2023               Content Version: 14.0    5265-6118 [facility].   Care instructions adapted under license by your healthcare professional. If you have questions about a medical condition or this instruction, always ask your healthcare professional. [facility] disclaims any warranty or liability for your use of this information.         "

## 2024-06-17 NOTE — ASSESSMENT & PLAN NOTE
Referral placed for colonoscopy, mammography, DEXA.  Cervical cancer screening as discussed elsewhere.  She is currently struggling financially as she lost her job and has not been able to find a job.  She expresses frustration.  Insomnia as discussed elsewhere.  Reviewed vaccine recommendations.  She will take this under advisement.

## 2024-06-17 NOTE — PROGRESS NOTES
Preventive Care Visit  [facility] SONIA Martinez MD, Family Medicine  Jun 17, 2024      Assessment & Plan   Problem List Items Addressed This Visit       Cervical high risk HPV (human papillomavirus) test positive     I was unsuccessful in identifying the cervix during Pap smear.  Referral placed for gynecology for evaluation.  The patient verbalized understanding.  Given that she did not follow-up in 2022 we will reach out to her in a week or 2 to confirm that she did get scheduled and undergo Pap smear.         Relevant Orders    Ob/Gyn  Referral    Class 2 severe obesity due to excess calories with serious comorbidity and body mass index (BMI) of 38.0 to 38.9 in adult (H)     We reviewed nutrition.  Stable weight.         Encounter for Medicare annual wellness exam - Primary     Referral placed for colonoscopy, mammography, DEXA.  Cervical cancer screening as discussed elsewhere.  She is currently struggling financially as she lost her job and has not been able to find a job.  She expresses frustration.  Insomnia as discussed elsewhere.  Reviewed vaccine recommendations.  She will take this under advisement.         Essential hypertension     No side effects.  Blood pressure within target range.  Continue losartan and hydrochlorothiazide.         Relevant Medications    losartan (COZAAR) 50 MG tablet    Other Relevant Orders    Basic metabolic panel  (Ca, Cl, CO2, Creat, Gluc, K, Na, BUN)    Hypercholesterolemia     It looks like she never started atorvastatin.  This would be primary prevention.  She has been smoking but at most 1 to 3 cigarettes/day.  She does not meet lung cancer screening guidelines.  Atorvastatin sent to mail order pharmacy which I think will improve adherence.         Relevant Medications    atorvastatin (LIPITOR) 20 MG tablet    Other Relevant Orders    Basic metabolic panel  (Ca, Cl, CO2, Creat, Gluc, K, Na, BUN)    Lipid panel reflex to direct LDL  "Fasting    ALT    Prediabetes     We will check a hemoglobin A1c with today's blood draw.  Based on her description of lifestyle improvements and physical activity anticipate improvement.         Relevant Orders    Basic metabolic panel  (Ca, Cl, CO2, Creat, Gluc, K, Na, BUN)    Hemoglobin A1c (Completed)    Primary insomnia     Helpful.  Will refill. Helps with sleep.          Relevant Medications    QUEtiapine (SEROQUEL) 300 MG tablet    Vitamin D deficiency    Relevant Orders    DEXA HIP/PELVIS/SPINE - Future     Other Visit Diagnoses       Screen for colon cancer        Relevant Orders    Colonoscopy Screening  Referral    Cervical cancer screening        Relevant Orders    Pap Screen with HPV - Recommended Age 30 - 65 Years    Medication management        Visit for screening mammogram        Relevant Orders    MA Screening Bilateral w/ Vick    Benign essential hypertension        Relevant Medications    hydroCHLOROthiazide 12.5 MG tablet    losartan (COZAAR) 50 MG tablet    Asymptomatic age-related postmenopausal state        Relevant Orders    DEXA HIP/PELVIS/SPINE - Future             Patient has been advised of split billing requirements and indicates understanding: Yes       Nicotine/Tobacco Cessation  She reports that she has been smoking cigarettes. She has a 1 pack-year smoking history. She has been exposed to tobacco smoke. She has never used smokeless tobacco.  Nicotine/Tobacco Cessation Plan  Information offered: Patient not interested at this time    BMI  Estimated body mass index is 38.78 kg/m  as calculated from the following:    Height as of this encounter: 1.562 m (5' 1.5\").    Weight as of this encounter: 94.6 kg (208 lb 9.6 oz).   Weight management plan: Discussed healthy diet and exercise guidelines    Counseling  Appropriate preventive services were discussed with this patient, including applicable screening as appropriate for fall prevention, nutrition, physical activity, " "Tobacco-use cessation, weight loss and cognition.  Checklist reviewing preventive services available has been given to the patient.  Reviewed patient's diet, addressing concerns and/or questions.   The patient was instructed to see the dentist every 6 months.     Mya Edwards is a 64 year old, presenting for the following:  Wellness Visit (Fasting )        6/17/2024    10:46 AM   Additional Questions   Roomed by xl         Health Care Directive  Patient does not have a Health Care Directive or Living Will: Discussed advance care planning with patient; information given to patient to review.    \"I need to work.\" Lost job.     HTN:   - no headaches. No dizziness.  No blurry vision.   - she does have lightheadedness when she gets up abruptly.     She has been working to lose weight.  More exercise (walking).   She has been eating leaner proteins.  She feels.    Tobacco: one cigarette per day.            6/17/2024   General Health   How would you rate your overall physical health? Good   Feel stress (tense, anxious, or unable to sleep) Rather much   (!) STRESS CONCERN      6/17/2024   Nutrition   Diet: Regular (no restrictions)         6/17/2024   Exercise   Days per week of moderate/strenous exercise 4 days         6/17/2024   Social Factors   Frequency of gathering with friends or relatives Twice a week   Worry food won't last until get money to buy more Yes   Food not last or not have enough money for food? Yes   Do you have housing?  Yes   Are you worried about losing your housing? No   Lack of transportation? No   Unable to get utilities (heat,electricity)? No   (!) FOOD SECURITY CONCERN PRESENT      6/17/2024   Fall Risk   Fallen 2 or more times in the past year? No    No   Trouble with walking or balance? No    No          6/17/2024   Activities of Daily Living- Home Safety   Needs help with the following daily activites None of the above   Safety concerns in the home None of the above         6/17/2024 "   Dental   Dentist two times every year? (!) NO         6/17/2024   Hearing Screening   Hearing concerns? None of the above         6/17/2024   Driving Risk Screening   Patient/family members have concerns about driving No         6/17/2024   General Alertness/Fatigue Screening   Have you been more tired than usual lately? No         6/17/2024   Urinary Incontinence Screening   Bothered by leaking urine in past 6 months No         6/17/2024   TB Screening   Were you born outside of the [place]? No         Today's PHQ-2 Score:       6/17/2024    10:44 AM   PHQ-2 ( 1999 Pf[employer])   Q1: Little interest or pleasure in doing things 0   Q2: Feeling down, depressed or hopeless 0   PHQ-2 Score 0   Q1: Little interest or pleasure in doing things Not at all   Q2: Feeling down, depressed or hopeless Not at all   PHQ-2 Score 0           6/17/2024   Substance Use   Alcohol more than 3/day or more than 7/wk No   Do you have a current opioid prescription? No   How severe/bad is pain from 1 to 10? 1/10   Do you use any other substances recreationally? No     Social History     Tobacco Use    Smoking status: Every Day     Current packs/day: 0.05     Average packs/day: 0.1 packs/day for 20.0 years (1.0 ttl pk-yrs)     Types: Cigarettes     Passive exposure: Current    Smokeless tobacco: Never    Tobacco comments:     1-2 cig per day   Vaping Use    Vaping status: Never Used   Substance Use Topics    Alcohol use: Never    Drug use: Never           1/20/2022   LAST FHS-7 RESULTS   1st degree relative breast or ovarian cancer No   Any relative bilateral breast cancer No   Any male have breast cancer No   Any ONE woman have BOTH breast AND ovarian cancer No   Any woman with breast cancer before 50yrs No   2 or more relatives with breast AND/OR ovarian cancer No   2 or more relatives with breast AND/OR bowel cancer No        Mammogram Screening - Mammogram every 1-2 years updated in Health Maintenance based on mutual decision making      History  of abnormal Pap smear: YES - reflected in Problem List and Health Maintenance accordingly        Latest Ref Rng & Units 8/6/2021     8:33 AM 9/25/2017     2:13 PM   PAP / HPV   PAP  Atypical squamous cells of undetermined significance (ASC-US)  Negative for squamous intraepithelial lesion or malignancy  Electronically signed by Radha Reese CT (ASCP) on 10/2/2017 at  9:21 AM      HPV 16 DNA Negative Negative     HPV 18 DNA Negative Negative     Other HR HPV Negative Positive       ASCVD Risk   The 10-year ASCVD risk score (Sami NICOLE, et al., 2019) is: 12.4%    Values used to calculate the score:      Age: 64 years      Sex: Female      Is Non- : No      Diabetic: No      Tobacco smoker: Yes      Systolic Blood Pressure: 120 mmHg      Is BP treated: Yes      HDL Cholesterol: 74 mg/dL      Total Cholesterol: 233 mg/dL        Reviewed and updated as needed this visit by Provider                  Current providers sharing in care for this patient include:  Patient Care Team:  Tobi Martinez MD as PCP - General (Family Medicine)  Tobi Martinez MD as Assigned PCP    The following health maintenance items are reviewed in Epic and correct as of today:  Health Maintenance   Topic Date Due    DEXA  Never done    COLORECTAL CANCER SCREENING  Never done    ZOSTER IMMUNIZATION (1 of 2) Never done    RSV VACCINE (Pregnancy & 60+) (1 - 1-dose 60+ series) Never done    Pneumococcal Vaccine: 65+ Years (2 of 2 - PCV) 08/06/2022    PAP FOLLOW-UP  09/03/2022    HPV FOLLOW-UP  09/03/2022    COVID-19 Vaccine (3 - 2023-24 season) 09/01/2023    Medicare Annual MTM Pharmacist Visit (once per calendar year)  Never done    MAMMO SCREENING  01/20/2024    LIPID  07/18/2024    ANNUAL REVIEW OF HM ORDERS  08/29/2024    INFLUENZA VACCINE (Season Ended) 09/01/2024    NICOTINE/TOBACCO CESSATION COUNSELING Q 1 YR  06/17/2025    MEDICARE ANNUAL WELLNESS VISIT  06/17/2025    FALL RISK ASSESSMENT   "06/17/2025    GLUCOSE  07/18/2026    ADVANCE CARE PLANNING  06/17/2029    DTAP/TDAP/TD IMMUNIZATION (3 - Td or Tdap) 08/06/2031    HEPATITIS C SCREENING  Completed    PHQ-2 (once per calendar year)  Completed    IPV IMMUNIZATION  Aged Out    HPV IMMUNIZATION  Aged Out    MENINGITIS IMMUNIZATION  Aged Out    RSV MONOCLONAL ANTIBODY  Aged Out    PAP  Discontinued    LUNG CANCER SCREENING  Discontinued       Review of Systems   Constitutional:  Negative for chills and fever.   HENT:  Negative for ear pain and sore throat.    Eyes:  Negative for pain and visual disturbance.   Respiratory:  Negative for cough and shortness of breath.    Cardiovascular:  Negative for chest pain and palpitations.   Gastrointestinal:  Negative for abdominal pain and vomiting.   Genitourinary:  Negative for dysuria and hematuria.   Musculoskeletal:  Negative for arthralgias and back pain.   Skin:  Negative for color change and rash.   Neurological:  Negative for seizures and syncope.   All other systems reviewed and are negative.         Objective    Exam  /84   Pulse 65   Temp 98.3  F (36.8  C) (Oral)   Resp 16   Ht 1.562 m (5' 1.5\")   Wt 94.6 kg (208 lb 9.6 oz)   LMP 07/23/2016 (Approximate)   SpO2 95%   BMI 38.78 kg/m     Estimated body mass index is 38.78 kg/m  as calculated from the following:    Height as of this encounter: 1.562 m (5' 1.5\").    Weight as of this encounter: 94.6 kg (208 lb 9.6 oz).    Physical Exam  Vitals reviewed. Exam conducted with a chaperone present.   Constitutional:       General: She is not in acute distress.     Appearance: Normal appearance. She is not ill-appearing.   HENT:      Head: Normocephalic and atraumatic.      Right Ear: External ear normal.      Left Ear: External ear normal.      Nose: Nose normal.      Mouth/Throat:      Pharynx: Oropharynx is clear. No oropharyngeal exudate or posterior oropharyngeal erythema.   Eyes:      General: No scleral icterus.        Right eye: No " discharge.         Left eye: No discharge.      Extraocular Movements: Extraocular movements intact.      Conjunctiva/sclera: Conjunctivae normal.      Pupils: Pupils are equal, round, and reactive to light.   Neck:      Comments: No thyromegaly.  Cardiovascular:      Rate and Rhythm: Normal rate and regular rhythm.      Heart sounds: Normal heart sounds. No murmur heard.     No friction rub. No gallop.   Pulmonary:      Effort: Pulmonary effort is normal. No respiratory distress.      Breath sounds: Normal breath sounds. No wheezing or rales.   Abdominal:      General: There is no distension.      Palpations: Abdomen is soft. There is no mass.      Tenderness: There is no abdominal tenderness.   Genitourinary:     Pubic Area: No rash.       Comments: Vaginal mucosa and vault appears without abnormality.  Is unable to find/identify the cervix.  Musculoskeletal:         General: No signs of injury. Normal range of motion.      Cervical back: Normal range of motion.      Right lower leg: No edema.      Left lower leg: No edema.   Lymphadenopathy:      Cervical: No cervical adenopathy.   Skin:     General: Skin is warm.      Coloration: Skin is not jaundiced.      Findings: No rash.   Neurological:      General: No focal deficit present.      Mental Status: She is alert and oriented to person, place, and time.      Cranial Nerves: No cranial nerve deficit.      Deep Tendon Reflexes: Reflexes normal.   Psychiatric:         Mood and Affect: Mood normal.           6/17/2024   Mini Cog   Clock Draw Score 2 Normal   3 Item Recall 3 objects recalled   Mini Cog Total Score 5              Signed Electronically by: Tobi Martinez MD

## 2024-06-17 NOTE — COMMUNITY RESOURCES LIST (ENGLISH)
June 17, 2024           YOUR PERSONALIZED LIST OF SERVICES & PROGRAMS           BENEFITS NAVIGATION    Benefits Eligibility Screening      [facility] application assistance - [address] [address] (Distance: 2.0 miles)  Phone: [phone]  Language: English, Macedonian  Fee: Free      [facility] application assistance  [address] [place] [address] (Distance: 2.0 miles)  Language: English  Fee: Free      [employer] [place] - SNAP (formerly food stamps) Screening and Application help  Phone: (782) 166-9502  Website: https://www.hungerBar Harbor BioTechnology.org/programs/[place]-food-helpline/  Language: English  Hours: Mon 10:00 AM - 5:00 PM Tue 10:00 AM - 5:00 PM Wed 10:00 AM - 5:00 PM Thu 10:00 AM - 5:00 PM Fri 10:00 AM - 5:00 PM  Fee: Free  Accessibility: Ada accessible, Blind accommodation, Deaf or hard of hearing, Translation services        FOOD ASSISTANCE    SNAP/WIC/Other Nutrition Benefits      [facility] application assistance  [address] [place] [address] (Distance: 2.0 miles)  Language: English  Fee: Free      [facility] application assistance - [address] [address] (Distance: 2.0 miles)  Phone: [phone]) [phone]  Language: English, Macedonian  Fee: Free      [employer] [place] - SNAP (formerly food stamps) Screening and Application help  Phone: (345) 169-9055  Website: https://www.[employer]Bar [employer].org/programs/[place]-food-helpline/  Language: English  Hours: Mon 10:00 AM - 5:00 PM Tue 10:00 AM - 5:00 PM Wed 10:00 AM - 5:00 PM Thu 10:00 AM - 5:00 PM Fri 10:00 AM - 5:00 PM  Fee: Free  Accessibility: [place] accessible, Blind accommodation, Deaf or hard of hearing, Translation services    Food Pantry      [employer] [facility] Food in the [facility]  [address] [place] [address]  [place] [address] (Distance: 15.0 miles)  Phone: (629) 470-2698  Website: https://www.goodintComparaMejor.comhood.org/our-programs/feeding-the-future/food-in-the-carnes/  Language: English  Fee: Free  Accessibility: [place] accessible      [facility] Food Shelf - Food pantry  [address] [place] [address] (Distance: 7.1 miles)  Phone: (674) 207-3565  Website: http://www.Nginx/  Language: English  Fee: Free      [facility]  Phone: (114) 211-1825  Website: https://www.advocateforMesolight.Fresh Nation  Language: English, Guatemalan  Hours: Mon 8:00 AM - 5:00 PM Tue 8:00 AM - 5:00 PM Wed 8:00 AM - 5:00 PM Thu 8:00 AM - 5:00 PM Fri 8:00 AM - 5:00 PM  Fee: Free  Accessibility: Ada accessible               IMPORTANT NUMBERS & WEBSITES        Emergency Services  911  .   [facility]  211 http://211unitedway.org  .   Poison Control  (754) 721-3200 http://[place]poison.org http://w[place]poison.org  .     Suicide and Crisis Lifeline  988 http://988lifeline.org  .   Childhelp [place] Child Abuse Hotline  395.977.5673 http://Childhelphotline.org   .   [place] Sexual Assault Hotline  (872) 381-3332 (HOPE) http://Rainn.org   .     National Runaway Safeline  (155) 927-9183 (RUNAWAY) http://1800runaEUDOWEB.org  .   Pregnancy & Postpartum Support  Call/text 037-278-4527  [place]: http://ppsupportmn.org  [place]: http://[employer]ion.com/wi  .   Substance Abuse National Helpline ([facility])  660-421-HELP (7777) http://Findtreatment.gov   .                DISCLAIMER: These resources have been generated via the [employer]. [employer] does not endorse any service providers mentioned in this resource list. [employer] does not guarantee that the services mentioned in this resource list will be available to you or will improve your health or wellness.    [id number] [facility]

## 2024-06-17 NOTE — ASSESSMENT & PLAN NOTE
It looks like she never started atorvastatin.  This would be primary prevention.  She has been smoking but at most 1 to 3 cigarettes/day.  She does not meet lung cancer screening guidelines.  Atorvastatin sent to mail order pharmacy which I think will improve adherence.

## 2024-06-17 NOTE — ASSESSMENT & PLAN NOTE
I was unsuccessful in identifying the cervix during Pap smear.  Referral placed for gynecology for evaluation.  The patient verbalized understanding.  Given that she did not follow-up in 2022 we will reach out to her in a week or 2 to confirm that she did get scheduled and undergo Pap smear.

## 2024-06-21 ENCOUNTER — LAB REQUISITION (OUTPATIENT)
Dept: LAB | Facility: CLINIC | Age: 64
End: 2024-06-21
Payer: COMMERCIAL

## 2024-06-21 ENCOUNTER — TRANSFERRED RECORDS (OUTPATIENT)
Dept: HEALTH INFORMATION MANAGEMENT | Facility: CLINIC | Age: 64
End: 2024-06-21
Payer: COMMERCIAL

## 2024-06-21 DIAGNOSIS — Z87.42 PERSONAL HISTORY OF OTHER DISEASES OF THE FEMALE GENITAL TRACT: ICD-10-CM

## 2024-06-21 PROCEDURE — 87624 HPV HI-RISK TYP POOLED RSLT: CPT | Mod: ORL | Performed by: OBSTETRICS & GYNECOLOGY

## 2024-06-21 PROCEDURE — G0145 SCR C/V CYTO,THINLAYER,RESCR: HCPCS | Mod: ORL | Performed by: OBSTETRICS & GYNECOLOGY

## 2024-06-25 LAB
HPV HR 12 DNA CVX QL NAA+PROBE: POSITIVE
HPV16 DNA CVX QL NAA+PROBE: NEGATIVE
HPV18 DNA CVX QL NAA+PROBE: NEGATIVE
HUMAN PAPILLOMA VIRUS FINAL DIAGNOSIS: ABNORMAL

## 2024-06-27 LAB
BKR LAB AP GYN ADEQUACY: ABNORMAL
BKR LAB AP GYN INTERPRETATION: ABNORMAL
PATH REPORT.COMMENTS IMP SPEC: ABNORMAL
PATH REPORT.COMMENTS IMP SPEC: ABNORMAL

## 2024-06-27 PROCEDURE — 88141 CYTOPATH C/V INTERPRET: CPT | Performed by: PATHOLOGY

## 2024-07-18 ENCOUNTER — PATIENT OUTREACH (OUTPATIENT)
Dept: CARE COORDINATION | Facility: CLINIC | Age: 64
End: 2024-07-18
Payer: COMMERCIAL

## 2024-09-06 DIAGNOSIS — F51.01 PRIMARY INSOMNIA: ICD-10-CM

## 2024-09-06 RX ORDER — QUETIAPINE FUMARATE 300 MG/1
300 TABLET, FILM COATED ORAL DAILY
Qty: 90 TABLET | Refills: 3 | Status: SHIPPED | OUTPATIENT
Start: 2024-09-06

## 2024-12-26 ENCOUNTER — PATIENT OUTREACH (OUTPATIENT)
Dept: CARE COORDINATION | Facility: CLINIC | Age: 64
End: 2024-12-26
Payer: COMMERCIAL